# Patient Record
Sex: FEMALE | Race: WHITE | NOT HISPANIC OR LATINO | Employment: OTHER | URBAN - METROPOLITAN AREA
[De-identification: names, ages, dates, MRNs, and addresses within clinical notes are randomized per-mention and may not be internally consistent; named-entity substitution may affect disease eponyms.]

---

## 2017-05-23 ENCOUNTER — HOSPITAL ENCOUNTER (OUTPATIENT)
Dept: MAMMOGRAPHY | Facility: CLINIC | Age: 77
Discharge: HOME | End: 2017-05-23
Attending: INTERNAL MEDICINE | Admitting: INTERNAL MEDICINE
Payer: MEDICARE

## 2017-05-23 DIAGNOSIS — Z12.31 VISIT FOR SCREENING MAMMOGRAM: ICD-10-CM

## 2017-05-23 PROCEDURE — 77063 BREAST TOMOSYNTHESIS BI: CPT

## 2017-05-23 PROCEDURE — G0202 SCR MAMMO BI INCL CAD: HCPCS

## 2018-05-25 ENCOUNTER — HOSPITAL ENCOUNTER (OUTPATIENT)
Dept: MAMMOGRAPHY | Facility: CLINIC | Age: 78
Discharge: HOME | End: 2018-05-25
Attending: INTERNAL MEDICINE | Admitting: INTERNAL MEDICINE
Payer: MEDICARE

## 2018-05-25 DIAGNOSIS — Z12.31 VISIT FOR SCREENING MAMMOGRAM: ICD-10-CM

## 2018-05-25 PROCEDURE — 77063 BREAST TOMOSYNTHESIS BI: CPT

## 2019-05-28 ENCOUNTER — HOSPITAL ENCOUNTER (OUTPATIENT)
Dept: MAMMOGRAPHY | Facility: CLINIC | Age: 79
Discharge: HOME | End: 2019-05-28
Attending: INTERNAL MEDICINE | Admitting: INTERNAL MEDICINE
Payer: MEDICARE

## 2019-05-28 DIAGNOSIS — Z12.31 VISIT FOR SCREENING MAMMOGRAM: ICD-10-CM

## 2019-05-28 PROCEDURE — 77063 BREAST TOMOSYNTHESIS BI: CPT

## 2020-06-16 ENCOUNTER — HOSPITAL ENCOUNTER (OUTPATIENT)
Dept: MAMMOGRAPHY | Facility: CLINIC | Age: 80
Discharge: HOME | End: 2020-06-16
Attending: INTERNAL MEDICINE | Admitting: INTERNAL MEDICINE
Payer: MEDICARE

## 2020-06-16 DIAGNOSIS — Z12.31 SCREENING MAMMOGRAM, ENCOUNTER FOR: ICD-10-CM

## 2020-06-16 PROCEDURE — 77067 SCR MAMMO BI INCL CAD: CPT

## 2021-06-22 ENCOUNTER — HOSPITAL ENCOUNTER (OUTPATIENT)
Dept: MAMMOGRAPHY | Facility: CLINIC | Age: 81
Discharge: HOME | End: 2021-06-22
Attending: INTERNAL MEDICINE | Admitting: INTERNAL MEDICINE
Payer: MEDICARE

## 2021-06-22 DIAGNOSIS — Z12.31 VISIT FOR SCREENING MAMMOGRAM: ICD-10-CM

## 2021-06-22 PROCEDURE — 77063 BREAST TOMOSYNTHESIS BI: CPT

## 2022-06-28 ENCOUNTER — HOSPITAL ENCOUNTER (OUTPATIENT)
Dept: MAMMOGRAPHY | Facility: CLINIC | Age: 82
Discharge: HOME | End: 2022-06-28
Attending: INTERNAL MEDICINE | Admitting: INTERNAL MEDICINE
Payer: MEDICARE

## 2022-06-28 DIAGNOSIS — Z12.31 VISIT FOR SCREENING MAMMOGRAM: ICD-10-CM

## 2022-06-28 PROCEDURE — 77067 SCR MAMMO BI INCL CAD: CPT

## 2022-11-14 ENCOUNTER — APPOINTMENT (OUTPATIENT)
Dept: CT IMAGING | Facility: CLINIC | Age: 82
DRG: 066 | End: 2022-11-14
Attending: EMERGENCY MEDICINE
Payer: MEDICARE

## 2022-11-14 ENCOUNTER — HOSPITAL ENCOUNTER (INPATIENT)
Facility: CLINIC | Age: 82
LOS: 1 days | Discharge: HOME | DRG: 066 | End: 2022-11-16
Attending: EMERGENCY MEDICINE | Admitting: HOSPITALIST
Payer: MEDICARE

## 2022-11-14 ENCOUNTER — APPOINTMENT (OUTPATIENT)
Dept: MRI IMAGING | Facility: CLINIC | Age: 82
DRG: 066 | End: 2022-11-14
Attending: EMERGENCY MEDICINE
Payer: MEDICARE

## 2022-11-14 DIAGNOSIS — I63.9 CEREBROVASCULAR ACCIDENT (CVA), UNSPECIFIED MECHANISM (H): ICD-10-CM

## 2022-11-14 DIAGNOSIS — I10 BENIGN ESSENTIAL HYPERTENSION: Primary | ICD-10-CM

## 2022-11-14 LAB
ANION GAP SERPL CALCULATED.3IONS-SCNC: 10 MMOL/L (ref 7–15)
APTT PPP: 31 SECONDS (ref 22–38)
BASOPHILS # BLD AUTO: 0.1 10E3/UL (ref 0–0.2)
BASOPHILS NFR BLD AUTO: 1 %
BUN SERPL-MCNC: 15.8 MG/DL (ref 8–23)
CALCIUM SERPL-MCNC: 9.7 MG/DL (ref 8.8–10.2)
CHLORIDE SERPL-SCNC: 105 MMOL/L (ref 98–107)
CREAT SERPL-MCNC: 0.87 MG/DL (ref 0.51–0.95)
DEPRECATED HCO3 PLAS-SCNC: 27 MMOL/L (ref 22–29)
EOSINOPHIL # BLD AUTO: 0.2 10E3/UL (ref 0–0.7)
EOSINOPHIL NFR BLD AUTO: 2 %
ERYTHROCYTE [DISTWIDTH] IN BLOOD BY AUTOMATED COUNT: 12.8 % (ref 10–15)
GFR SERPL CREATININE-BSD FRML MDRD: 66 ML/MIN/1.73M2
GLUCOSE BLDC GLUCOMTR-MCNC: 104 MG/DL (ref 70–99)
GLUCOSE BLDC GLUCOMTR-MCNC: 104 MG/DL (ref 70–99)
GLUCOSE SERPL-MCNC: 108 MG/DL (ref 70–99)
HBA1C MFR BLD: 5.4 %
HCT VFR BLD AUTO: 39.2 % (ref 35–47)
HGB BLD-MCNC: 12.3 G/DL (ref 11.7–15.7)
IMM GRANULOCYTES # BLD: 0 10E3/UL
IMM GRANULOCYTES NFR BLD: 0 %
INR PPP: 1.05 (ref 0.85–1.15)
LYMPHOCYTES # BLD AUTO: 2.9 10E3/UL (ref 0.8–5.3)
LYMPHOCYTES NFR BLD AUTO: 36 %
MCH RBC QN AUTO: 31.1 PG (ref 26.5–33)
MCHC RBC AUTO-ENTMCNC: 31.4 G/DL (ref 31.5–36.5)
MCV RBC AUTO: 99 FL (ref 78–100)
MONOCYTES # BLD AUTO: 0.8 10E3/UL (ref 0–1.3)
MONOCYTES NFR BLD AUTO: 10 %
NEUTROPHILS # BLD AUTO: 4.1 10E3/UL (ref 1.6–8.3)
NEUTROPHILS NFR BLD AUTO: 51 %
NRBC # BLD AUTO: 0 10E3/UL
NRBC BLD AUTO-RTO: 0 /100
PLATELET # BLD AUTO: 301 10E3/UL (ref 150–450)
POTASSIUM SERPL-SCNC: 3.7 MMOL/L (ref 3.4–5.3)
RBC # BLD AUTO: 3.95 10E6/UL (ref 3.8–5.2)
SARS-COV-2 RNA RESP QL NAA+PROBE: NEGATIVE
SODIUM SERPL-SCNC: 142 MMOL/L (ref 136–145)
TROPONIN T SERPL HS-MCNC: 10 NG/L
WBC # BLD AUTO: 8.1 10E3/UL (ref 4–11)

## 2022-11-14 PROCEDURE — G1010 CDSM STANSON: HCPCS

## 2022-11-14 PROCEDURE — 250N000013 HC RX MED GY IP 250 OP 250 PS 637: Performed by: EMERGENCY MEDICINE

## 2022-11-14 PROCEDURE — 83036 HEMOGLOBIN GLYCOSYLATED A1C: CPT | Performed by: PHYSICIAN ASSISTANT

## 2022-11-14 PROCEDURE — 36415 COLL VENOUS BLD VENIPUNCTURE: CPT | Performed by: EMERGENCY MEDICINE

## 2022-11-14 PROCEDURE — 80048 BASIC METABOLIC PNL TOTAL CA: CPT | Performed by: EMERGENCY MEDICINE

## 2022-11-14 PROCEDURE — 82962 GLUCOSE BLOOD TEST: CPT

## 2022-11-14 PROCEDURE — 255N000002 HC RX 255 OP 636: Performed by: EMERGENCY MEDICINE

## 2022-11-14 PROCEDURE — 85730 THROMBOPLASTIN TIME PARTIAL: CPT | Performed by: EMERGENCY MEDICINE

## 2022-11-14 PROCEDURE — 85610 PROTHROMBIN TIME: CPT | Performed by: EMERGENCY MEDICINE

## 2022-11-14 PROCEDURE — A9585 GADOBUTROL INJECTION: HCPCS | Performed by: EMERGENCY MEDICINE

## 2022-11-14 PROCEDURE — G0378 HOSPITAL OBSERVATION PER HR: HCPCS

## 2022-11-14 PROCEDURE — 96361 HYDRATE IV INFUSION ADD-ON: CPT

## 2022-11-14 PROCEDURE — 93005 ELECTROCARDIOGRAM TRACING: CPT

## 2022-11-14 PROCEDURE — 99285 EMERGENCY DEPT VISIT HI MDM: CPT | Mod: 25

## 2022-11-14 PROCEDURE — U0003 INFECTIOUS AGENT DETECTION BY NUCLEIC ACID (DNA OR RNA); SEVERE ACUTE RESPIRATORY SYNDROME CORONAVIRUS 2 (SARS-COV-2) (CORONAVIRUS DISEASE [COVID-19]), AMPLIFIED PROBE TECHNIQUE, MAKING USE OF HIGH THROUGHPUT TECHNOLOGIES AS DESCRIBED BY CMS-2020-01-R: HCPCS | Performed by: EMERGENCY MEDICINE

## 2022-11-14 PROCEDURE — 250N000011 HC RX IP 250 OP 636: Performed by: EMERGENCY MEDICINE

## 2022-11-14 PROCEDURE — 85004 AUTOMATED DIFF WBC COUNT: CPT | Performed by: EMERGENCY MEDICINE

## 2022-11-14 PROCEDURE — 70498 CT ANGIOGRAPHY NECK: CPT | Mod: MG

## 2022-11-14 PROCEDURE — 96374 THER/PROPH/DIAG INJ IV PUSH: CPT | Mod: 59

## 2022-11-14 PROCEDURE — 258N000003 HC RX IP 258 OP 636: Performed by: EMERGENCY MEDICINE

## 2022-11-14 PROCEDURE — 70496 CT ANGIOGRAPHY HEAD: CPT | Mod: MG

## 2022-11-14 PROCEDURE — 99220 PR INITIAL OBSERVATION CARE,LEVEL III: CPT | Performed by: PHYSICIAN ASSISTANT

## 2022-11-14 PROCEDURE — 84484 ASSAY OF TROPONIN QUANT: CPT | Performed by: EMERGENCY MEDICINE

## 2022-11-14 PROCEDURE — G0509 CRIT CARE TELEHEA CONSULT 50: HCPCS | Mod: G0 | Performed by: PSYCHIATRY & NEUROLOGY

## 2022-11-14 PROCEDURE — C9803 HOPD COVID-19 SPEC COLLECT: HCPCS

## 2022-11-14 RX ORDER — ONDANSETRON 2 MG/ML
4 INJECTION INTRAMUSCULAR; INTRAVENOUS EVERY 6 HOURS PRN
Status: DISCONTINUED | OUTPATIENT
Start: 2022-11-14 | End: 2022-11-16 | Stop reason: HOSPADM

## 2022-11-14 RX ORDER — HYDRALAZINE HYDROCHLORIDE 20 MG/ML
10-20 INJECTION INTRAMUSCULAR; INTRAVENOUS
Status: DISCONTINUED | OUTPATIENT
Start: 2022-11-14 | End: 2022-11-16 | Stop reason: HOSPADM

## 2022-11-14 RX ORDER — GADOBUTROL 604.72 MG/ML
6.5 INJECTION INTRAVENOUS ONCE
Status: COMPLETED | OUTPATIENT
Start: 2022-11-14 | End: 2022-11-14

## 2022-11-14 RX ORDER — CLOPIDOGREL BISULFATE 75 MG/1
300 TABLET ORAL ONCE
Status: COMPLETED | OUTPATIENT
Start: 2022-11-14 | End: 2022-11-14

## 2022-11-14 RX ORDER — LIDOCAINE 40 MG/G
CREAM TOPICAL
Status: DISCONTINUED | OUTPATIENT
Start: 2022-11-14 | End: 2022-11-16 | Stop reason: HOSPADM

## 2022-11-14 RX ORDER — ASPIRIN 81 MG/1
81 TABLET ORAL DAILY
Status: CANCELLED | OUTPATIENT
Start: 2022-11-15

## 2022-11-14 RX ORDER — CHOLECALCIFEROL (VITAMIN D3) 50 MCG
1 TABLET ORAL DAILY
COMMUNITY

## 2022-11-14 RX ORDER — TRIAMCINOLONE ACETONIDE 1 MG/G
CREAM TOPICAL PRN
COMMUNITY

## 2022-11-14 RX ORDER — CLOPIDOGREL BISULFATE 75 MG/1
75 TABLET ORAL DAILY
Status: DISCONTINUED | OUTPATIENT
Start: 2022-11-15 | End: 2022-11-16 | Stop reason: HOSPADM

## 2022-11-14 RX ORDER — LABETALOL HYDROCHLORIDE 5 MG/ML
10-20 INJECTION, SOLUTION INTRAVENOUS EVERY 10 MIN PRN
Status: DISCONTINUED | OUTPATIENT
Start: 2022-11-14 | End: 2022-11-16 | Stop reason: HOSPADM

## 2022-11-14 RX ORDER — DIAZEPAM 10 MG/2ML
2.5 INJECTION, SOLUTION INTRAMUSCULAR; INTRAVENOUS ONCE
Status: COMPLETED | OUTPATIENT
Start: 2022-11-14 | End: 2022-11-14

## 2022-11-14 RX ORDER — CALCIUM CARBONATE 500 MG/1
1000 TABLET, CHEWABLE ORAL 4 TIMES DAILY PRN
Status: DISCONTINUED | OUTPATIENT
Start: 2022-11-14 | End: 2022-11-16 | Stop reason: HOSPADM

## 2022-11-14 RX ORDER — ONDANSETRON 4 MG/1
4 TABLET, ORALLY DISINTEGRATING ORAL EVERY 6 HOURS PRN
Status: DISCONTINUED | OUTPATIENT
Start: 2022-11-14 | End: 2022-11-16 | Stop reason: HOSPADM

## 2022-11-14 RX ORDER — ASPIRIN 325 MG
325 TABLET ORAL ONCE
Status: COMPLETED | OUTPATIENT
Start: 2022-11-14 | End: 2022-11-14

## 2022-11-14 RX ORDER — PANTOPRAZOLE SODIUM 40 MG/1
40 TABLET, DELAYED RELEASE ORAL
Status: DISCONTINUED | OUTPATIENT
Start: 2022-11-15 | End: 2022-11-16 | Stop reason: HOSPADM

## 2022-11-14 RX ORDER — IOPAMIDOL 755 MG/ML
500 INJECTION, SOLUTION INTRAVASCULAR ONCE
Status: COMPLETED | OUTPATIENT
Start: 2022-11-14 | End: 2022-11-14

## 2022-11-14 RX ORDER — ACETAMINOPHEN 325 MG/1
650 TABLET ORAL EVERY 4 HOURS PRN
Status: DISCONTINUED | OUTPATIENT
Start: 2022-11-14 | End: 2022-11-16 | Stop reason: HOSPADM

## 2022-11-14 RX ORDER — ASPIRIN 81 MG/1
81 TABLET, CHEWABLE ORAL DAILY
Status: DISCONTINUED | OUTPATIENT
Start: 2022-11-15 | End: 2022-11-14

## 2022-11-14 RX ADMIN — ASPIRIN 325 MG ORAL TABLET 325 MG: 325 PILL ORAL at 12:33

## 2022-11-14 RX ADMIN — IOPAMIDOL 75 ML: 755 INJECTION, SOLUTION INTRAVENOUS at 11:12

## 2022-11-14 RX ADMIN — GADOBUTROL 6.5 ML: 604.72 INJECTION INTRAVENOUS at 12:55

## 2022-11-14 RX ADMIN — SODIUM CHLORIDE 100 ML: 9 INJECTION, SOLUTION INTRAVENOUS at 11:13

## 2022-11-14 RX ADMIN — CLOPIDOGREL BISULFATE 300 MG: 75 TABLET ORAL at 12:33

## 2022-11-14 RX ADMIN — DIAZEPAM 2.5 MG: 5 INJECTION, SOLUTION INTRAMUSCULAR; INTRAVENOUS at 12:33

## 2022-11-14 ASSESSMENT — ACTIVITIES OF DAILY LIVING (ADL)
CONCENTRATING,_REMEMBERING_OR_MAKING_DECISIONS_DIFFICULTY: NO
ADLS_ACUITY_SCORE: 33
DOING_ERRANDS_INDEPENDENTLY_DIFFICULTY: NO
DIFFICULTY_COMMUNICATING: NO
WALKING_OR_CLIMBING_STAIRS_DIFFICULTY: NO
FALL_HISTORY_WITHIN_LAST_SIX_MONTHS: NO
ADLS_ACUITY_SCORE: 35
CHANGE_IN_FUNCTIONAL_STATUS_SINCE_ONSET_OF_CURRENT_ILLNESS/INJURY: NO
TOILETING_ISSUES: NO
ADLS_ACUITY_SCORE: 35
DRESSING/BATHING_DIFFICULTY: NO
ADLS_ACUITY_SCORE: 20
ADLS_ACUITY_SCORE: 33
ADLS_ACUITY_SCORE: 35
ADLS_ACUITY_SCORE: 35
DIFFICULTY_EATING/SWALLOWING: NO

## 2022-11-14 ASSESSMENT — ENCOUNTER SYMPTOMS
NAUSEA: 0
FACIAL ASYMMETRY: 1
WEAKNESS: 0
SPEECH DIFFICULTY: 1
TROUBLE SWALLOWING: 0

## 2022-11-14 NOTE — ED TRIAGE NOTES
Came in via EMS, was at the [employer] dancing when pt had difficulty with speech, unable to say words, L sided facial droop. Occurred at 1010. BPs were 220-230 systolic.  here in ED. No cardiac hx. HX high cholesterol and on estrogen supplements per EMS.      Triage Assessment     Row Name 11/14/22 1056       Triage Assessment (Adult)    Airway WDL WDL       Respiratory WDL    Respiratory WDL WDL       Skin Circulation/Temperature WDL    Skin Circulation/Temperature WDL WDL       Cardiac WDL    Cardiac WDL X  HTN       Cognitive/Neuro/Behavioral WDL    Cognitive/Neuro/Behavioral WDL X    Level of Consciousness alert    Orientation oriented x 4    Speech slow;logical       [place] Coma Scale    Best Eye Response 4-->(E4) spontaneous    Best Motor Response 6-->(M6) obeys commands    Best Verbal Response 5-->(V5) oriented    [place] Coma Scale Score 15

## 2022-11-14 NOTE — LETTER
[place] [address] [facility]  [address] [facility] [address]  [phone]    Re: Candy Garsia  [address] [facility] [address]  [phone] (home)     : 1940      To Whom It May Concern:      Candy Garsia was hospitalized from 2022 until 2022 due to medical illness.  She may be able to drive and operate a motor vehicle if she has no ongoing symptomatology.       Sincerely,  Eriberto Torre MD

## 2022-11-14 NOTE — PHARMACY-ADMISSION MEDICATION HISTORY
Admission medication history interview status for this patient is complete. See [facility] admission navigator for allergy information, prior to admission medications and immunization status.     Medication history interview done, indicate source(s): Patient (with family present in ED room)  Medication history resources (including written lists, pill bottles, clinic record):None  Pharmacy: [facility] in [employer] in [place].    Changes made to PTA medication list:  Added: vit D, triamcinolone cream  Changed: estradiol  Reported as Not Taking: meclizine, zofran, oxycodone  Removed: meclizine, zofran, oxycodone    Actions taken by pharmacist (provider contacted, etc):None     Additional medication history information:None    Medication reconciliation/reorder completed by provider prior to medication history?  no   (Y/N)     For patients on insulin therapy: no    Medication Sig Last Dose Taking?   cyanocobalamin 1000 MCG/ML injection Inject 1 mL as directed every 30 days. 11/3/2022 Yes   ESTRADIOL PO Take 0.5 mg by mouth twice a week Wed/Sat 11/12/2022 Yes   rabeprazole (ACIPHEX) 20 MG tablet Take 1 tablet by mouth daily. 11/14/2022 at am Yes   simvastatin (ZOCOR) 20 MG tablet Take 1 tablet by mouth At Bedtime. 11/13/2022 Yes   triamcinolone (KENALOG) 0.1 % external cream Apply topically as needed for irritation prn Yes   vitamin D3 (CHOLECALCIFEROL) 50 mcg (2000 units) tablet Take 1 tablet by mouth daily 11/14/2022 Yes

## 2022-11-14 NOTE — ED PROVIDER NOTES
History   Chief Complaint:  Stroke Symptoms       The history is provided by the patient and the EMS personnel.      Candy Garsia is a 82 year old female with history of hypercholesterolemia who presents with stroke symptoms. The patient was at a senior recreation center with friends, around 1015 she began having trouble getting her words out along with a left sided facial droop. She does not take any blood thinners, or had any head trauma. She denies any visual changes, trouble swallowing, nausea, weakness to extremities or head injury.    Review of Systems   HENT: Negative for trouble swallowing.    Eyes: Negative for visual disturbance.   Gastrointestinal: Negative for nausea.   Neurological: Positive for facial asymmetry (left sided droop) and speech difficulty. Negative for weakness.        (-) head injury   All other systems reviewed and are negative.    Allergies:  Compazine [Prochlorperazine]  Septra [Sulfamethoxazole W/Trimethoprim]  Zithromax [Azithromycin]  Latex    Medications:  Estradiol   Cyanocobalamin   Antivert   Zofran   Aciphex   Zocor     Past Medical History:     B 12 deficiency anemia   GERD  Dyslipidemia   Vertigo    Skin cancer   Benign tumor of uterus   Hypercholesterolemia      Past Surgical History:    Tonsillectomy   Hysterectomy   Skin biopsy   Colonoscopy     Family History:    Thyroid disease   Hyperlipidemia   Stomach cancer   Heart disease     Social History:  The patient presents to the ED alone  The patient [facility] from senior recreational facility.  Living Situation: independently   PCP: Angel Zapata     Physical Exam     Patient Vitals for the past 24 hrs:   BP Pulse Resp SpO2 Weight   11/14/22 1145 (!) 199/99 82 (!) 35 99 % --   11/14/22 1130 (!) 198/149 79 -- 98 % --   11/14/22 1123 -- -- -- 97 % --   11/14/22 1115 (!) 206/90 88 -- -- --   11/14/22 1103 -- -- -- -- 64 kg (141 lb 1.5 oz)   11/14/22 1100 -- -- -- 100 % --   11/14/22 1055 (!) 234/99 100 20 98 %  --       Physical Exam    HEENT:    Oropharynx is moist, without lesions or trismus.  Eyes:    Conjunctiva normal     PERRL     EOMs and visual fields intact  Neck:    Supple, no meningismus.     CV:     Regular rate and rhythm.      No murmurs, rubs or gallops.       No unilateral leg swelling.       2+ radial pulses bilateral.    PULM:    Clear to auscultation bilateral.       No respiratory distress.      Good air exchange.     No rales or wheezing.     No stridor.  ABD:    Soft, non-tender, non-distended.       No pulsatile masses.       No rebound, guarding or rigidity.  MSK:     No gross deformity to all four extremities.   LYMPH:   No cervical lymphadenopathy.  NEURO:   Alert & O x 3     Left CN VII weakness sparing the forehead otherwise CN's II-XII intact     Mild-moderate expressive aphasia     Finger to nose within normal limits.  No pronator drift.       Strength is 5/5 in all 4 extremities.  Sensation is intact.       Normal muscular tone, no tremor.  .   Good muscle tone, no atrophy.  Skin:    Warm, dry and intact.    Psych:    Mood is good and affect is appropriate.        Emergency Department Course   ECG  ECG obtained at 1144, ECG read at 1153  Normal sinus rhythm   Nonspecific ST abnormality   Abnormal ECG  No significant differences from prior ECG dated 7/4/15.  Rate 72 bpm. KY interval 164 ms. QRS duration 90 ms. QT/QTc 408/446 ms. P-R-T axes 60 -22 4.    Imaging:  MR Brain w/o & w Contrast   Preliminary Result   IMPRESSION:   1. Findings concerning for a punctate focus of hyperacute versus acute   ischemia/infarct involving the right inferior frontal gyrus.   2. Otherwise, no acute intracranial process.   3. Brain atrophy and presumed chronic small vessel ischemic changes,   as described.   4. Chronic cortical microhemorrhage in the lateral aspect of the left   precentral gyrus.      CTA Head Neck w Contrast   Preliminary Result   IMPRESSION:   1. Segmental severe stenosis versus subtotal  occlusion of an M2 branch   of the right middle cerebral artery, as described.   2. Otherwise, no high-grade stenosis or large vessel occlusion of the   major intracranial arteries.   3. Findings concerning for fibromuscular dysplasia involving the   bilateral internal carotid arteries.   4. Approximately 50-70% stenosis of the proximal left internal carotid   artery due to atherosclerosis.      Findings were discussed with Bolivar FAYE of the emergency   department at approximately 11:27 AM on 11/14/2022.      CT Head w/o Contrast   Final Result   IMPRESSION:   1. No acute intracranial hemorrhage, extra-axial fluid collection, or   mass effect.   2. Mild scattered patchy hypoattenuation in the cerebral white matter,   as described. This may represent sequela of chronic ischemic change.   No large confluent territorial infarction identified on CT.   3. Mild generalized brain parenchymal volume loss.      LEXI EDUARDO MD            SYSTEM ID:  [id number]        Report per radiology    Laboratory:  Labs Ordered and Resulted from Time of ED Arrival to Time of ED Departure   BASIC METABOLIC PANEL - Abnormal       Result Value    Sodium 142      Potassium 3.7      Chloride 105      Carbon Dioxide (CO2) 27      Anion Gap 10      Urea Nitrogen 15.8      Creatinine 0.87      Calcium 9.7      Glucose 108 (*)     GFR Estimate 66     CBC WITH PLATELETS AND DIFFERENTIAL - Abnormal    WBC Count 8.1      RBC Count 3.95      Hemoglobin 12.3      Hematocrit 39.2      MCV 99      MCH 31.1      MCHC 31.4 (*)     RDW 12.8      Platelet Count 301      % Neutrophils 51      % Lymphocytes 36      % Monocytes 10      % Eosinophils 2      % Basophils 1      % Immature Granulocytes 0      NRBCs per 100 WBC 0      Absolute Neutrophils 4.1      Absolute Lymphocytes 2.9      Absolute Monocytes 0.8      Absolute Eosinophils 0.2      Absolute Basophils 0.1      Absolute Immature Granulocytes 0.0      Absolute NRBCs 0.0     GLUCOSE BY METER -  Abnormal    GLUCOSE BY METER POCT 104 (*)    INR - Normal    INR 1.05     PARTIAL THROMBOPLASTIN TIME - Normal    aPTT 31     TROPONIN T, HIGH SENSITIVITY - Normal    Troponin T, High Sensitivity 10     GLUCOSE MONITOR NURSING POCT        Emergency Department Course:       Reviewed:  I reviewed nursing notes, vitals, past medical history and Care Everywhere    Assessments:  1056 I obtained history and examined the patient as noted above.   1217 I rechecked the patient and explained findings.     Consults:  1102 I consulted with stroke neurology about the patient and plan of care.  1220 I consulted with stroke neurology about the patient and plan of care.   I consulted with Alayna FAYE about the patient and plan of care.  1423 I consulted Dr. Betty MAY vascular surgery    Interventions:  1233 Aspirin 325 mg PO  1233 Plavix 300 mg PO  1233 Valium 2.5 mg IV  1255 Gadavist 6.5 mL IV    Disposition:  The patient was admitted to the hospital under the care of Dr. Thompson.     Impression & Plan       National Institutes of Health Stroke Scale  Exam Interval: Baseline   Score    Level of consciousness: (0)   Alert, keenly responsive    LOC questions: (0)   Answers both questions correctly    LOC commands: (0)   Performs both tasks correctly    Best gaze: (0)   Normal    Visual: (0)   No visual loss    Facial palsy: (2)   Partial paralysis (total/near total of lower face)    Motor arm (left): (0)   No drift    Motor arm (right): (0)   No drift    Motor leg (left): (0)   No drift    Motor leg (right): (0)   No drift    Limb ataxia: (0)   Absent    Sensory: (0)   Normal- no sensory loss    Best language: (1)   Mild to moderate aphasia    Dysarthria: (0)   Normal    Extinction and inattention: (0)   No abnormality        Total Score:  3          Medical Decision Makin-year-old female presented to the ED with abrupt onset of expressive aphasia and left cranial nerve VII weakness sparing the forehead in which  symptoms began 45 minutes prior to arrival.  She had no stroke mimics including hypoglycemia.  She underwent the tier 1 code stroke process.  Noncontrast CT scan was unremarkable but CTA revealed some vascular abnormalities.  Patient's symptoms completely resolved thus was not a tPA candidate.  Stroke neurology recommended load of aspirin and Plavix which was provided followed by MRI and admission.      I also spoke with Dr. Hernández of vascular surgery regarding the vascular findings in the face of a right inferior frontal gyrus infarct.  After discussion of case, vascular surgery feels comfortable with the patient being admitted at [facility] and outpatient follow-up with vascular surgery regarding the carotid artery stenosis of 50-70%.    Diagnosis:    ICD-10-CM    1. Cerebrovascular accident (CVA), unspecified mechanism (H)  I63.9           Discharge Medications:  New Prescriptions    No medications on file       Scribe Disclosure:  I, Rao Skinner, am serving as a scribe at 10:55 AM on 11/14/2022 to document services personally performed by Jeff Blanc MD based on my observations and the provider's statements to me.        Jeff Blanc MD  11/14/22 6236

## 2022-11-14 NOTE — ED NOTES
[facility]  ED Nurse Handoff Report    Candy Garsia is a 82 year old female   ED Chief complaint: Stroke Symptoms  . ED Diagnosis:   Final diagnoses:   Cerebrovascular accident (CVA), unspecified mechanism (H)     Allergies:   Allergies   Allergen Reactions     Compazine [Prochlorperazine]      Septra [Sulfamethoxazole W/Trimethoprim]      Zithromax [Azithromycin]        Code Status: Full Code  Activity level - Baseline/Home:  Independent. Activity Level - Current:   Stand by Assist. Lift room needed: No. Bariatric: No   Needed: No   Isolation: No. Infection: Not Applicable.     Vital Signs:   Vitals:    11/14/22 1358 11/14/22 1400 11/14/22 1411 11/14/22 1426   BP: (!) 198/95 (!) 198/86 (!) 194/84 (!) 189/80   BP Location:       Pulse: 63 62 60 65   Resp:  16     SpO2: 98% 99% 99% 100%   Weight:           Cardiac Rhythm:  ,      Pain level:    Patient confused: No. Patient Falls Risk: Yes.   Elimination Status: Has voided   Patient Report - Initial Complaint: CVA. Focused Assessment: The history is provided by the patient and the EMS personnel.      Candy Garsia is a 82 year old female with history of hypercholesterolemia who presents with stroke symptoms. The patient was at a senior recreation center with friends, around 1015 she began having trouble getting her words out along with a left sided facial droop. She does not take any blood thinners, or had any head trauma. She denies any visual changes, trouble swallowing, nausea, weakness to extremities or head injury.     Review of Systems   HENT: Negative for trouble swallowing.    Eyes: Negative for visual disturbance.   Gastrointestinal: Negative for nausea.   Neurological: Positive for facial asymmetry (left sided droop) and speech difficulty. Negative for weakness.        (-) head injury   All other systems reviewed and are negative.   Tests Performed: EKG, Labs, Imaging. Abnormal Results:   MR Brain w/o & w Contrast   Preliminary  Result   IMPRESSION:   1. Findings concerning for a punctate focus of hyperacute versus acute   ischemia/infarct involving the right inferior frontal gyrus.   2. Otherwise, no acute intracranial process.   3. Brain atrophy and presumed chronic small vessel ischemic changes,   as described.   4. Chronic cortical microhemorrhage in the lateral aspect of the left   precentral gyrus.      CTA Head Neck w Contrast   Preliminary Result   IMPRESSION:   1. Segmental severe stenosis versus subtotal occlusion of an M2 branch   of the right middle cerebral artery, as described.   2. Otherwise, no high-grade stenosis or large vessel occlusion of the   major intracranial arteries.   3. Findings concerning for fibromuscular dysplasia involving the   bilateral internal carotid arteries.   4. Approximately 50-70% stenosis of the proximal left internal carotid   artery due to atherosclerosis.      Findings were discussed with Bolivar FAYE of the emergency   department at approximately 11:27 AM on 11/14/2022.      CT Head w/o Contrast   Final Result   IMPRESSION:   1. No acute intracranial hemorrhage, extra-axial fluid collection, or   mass effect.   2. Mild scattered patchy hypoattenuation in the cerebral white matter,   as described. This may represent sequela of chronic ischemic change.   No large confluent territorial infarction identified on CT.   3. Mild generalized brain parenchymal volume loss.      LEXI EDUARDO MD            SYSTEM ID:  [id number]        Labs Ordered and Resulted from Time of ED Arrival to Time of ED Departure   BASIC METABOLIC PANEL - Abnormal       Result Value    Sodium 142      Potassium 3.7      Chloride 105      Carbon Dioxide (CO2) 27      Anion Gap 10      Urea Nitrogen 15.8      Creatinine 0.87      Calcium 9.7      Glucose 108 (*)     GFR Estimate 66     CBC WITH PLATELETS AND DIFFERENTIAL - Abnormal    WBC Count 8.1      RBC Count 3.95      Hemoglobin 12.3      Hematocrit 39.2      MCV 99       MCH 31.1      MCHC 31.4 (*)     RDW 12.8      Platelet Count 301      % Neutrophils 51      % Lymphocytes 36      % Monocytes 10      % Eosinophils 2      % Basophils 1      % Immature Granulocytes 0      NRBCs per 100 WBC 0      Absolute Neutrophils 4.1      Absolute Lymphocytes 2.9      Absolute Monocytes 0.8      Absolute Eosinophils 0.2      Absolute Basophils 0.1      Absolute Immature Granulocytes 0.0      Absolute NRBCs 0.0     GLUCOSE BY METER - Abnormal    GLUCOSE BY METER POCT 104 (*)    INR - Normal    INR 1.05     PARTIAL THROMBOPLASTIN TIME - Normal    aPTT 31     TROPONIN T, HIGH SENSITIVITY - Normal    Troponin T, High Sensitivity 10     GLUCOSE MONITOR NURSING POCT   COVID-19 VIRUS (CORONAVIRUS) BY PCR     .   Treatments provided: See MAR  Family Comments: Family at bedside  OBS brochure/video discussed/provided to patient:  Yes  ED Medications:   Medications   sodium chloride (PF) 0.9% PF flush 60 mL (60 mLs Intravenous Not Given 11/14/22 1334)   0.9% sodium chloride BOLUS (0 mLs Intravenous Stopped 11/14/22 1348)   iopamidol (ISOVUE-370) solution 500 mL (75 mLs Intravenous Given 11/14/22 1112)   aspirin (ASA) tablet 325 mg (325 mg Oral Given 11/14/22 1233)   clopidogrel (PLAVIX) tablet 300 mg (300 mg Oral Given 11/14/22 1233)   gadobutrol (GADAVIST) injection 6.5 mL (6.5 mLs Intravenous Given 11/14/22 1255)   diazepam (VALIUM) injection 2.5 mg (2.5 mg Intravenous Given 11/14/22 1233)     Drips infusing:  No  For the majority of the shift, the patient's behavior Green. Interventions performed were N/A.    Sepsis treatment initiated: No     Patient tested for COVID 19 prior to admission: YES    ED Nurse Name/Phone Number: Nilda Jamison RN,   2:41 PM  RECEIVING UNIT ED HANDOFF REVIEW    Above ED Nurse Handoff Report was reviewed: Yes  Reviewed by: Ngoc Thompson RN on November 14, 2022 at 5:53 PM

## 2022-11-14 NOTE — CONSULTS
"[facility]    Stroke Consult Note    Reason for Consult: Stroke Code     Chief Complaint: Stroke Symptoms      HPI  Candy Garsia is a 82 year old female with PMH of HLD. She presents to the ED for evaluation of weakness and speech changes. LKW was 0945 at which time she developed acute onset aphasia and left facial droop. Presenting /99. She is not on known anticoagulation or antiplatelets.     On tele exam, NIH is 1 for very subtle left facial droop, although her  denies any identifiable change in how her face appears vs her baseline. Candy reiterates that she suddenly experienced her speaking \"coming out funny, slow\" and he  felt that she was somewhat slurred when they initially spoke over the phone. No identified weakness of the limbs, numbness, visual disturbance or other neurological symptoms.     Imaging Findings  CT head: no hemorrhage or other acute findings  CTA head/neck: severe stenosis vs subtotal occlusion of right M2, plaque with 50-70% stenosis of left ICA, mild plaque/calcification in right ICA stenosis    Intravenous Thrombolysis  Not given due to:   - minor/isolated/quickly resolving symptoms    Endovascular Treatment  Not initiated due to absence of proximal vessel occlusion    Impression   1. Left facial droop and aphasia; greatly improved with NIH=1 for mild facial droop. Concern for small infarct vs TIA. Patient not currently candidate for acute treatment given low NIH and no LVO    2. Right M2 stenosis vs subocclusive thrombus    3. Left ICA plaque and stenosis    Recommendations  - Neurochecks and Vital Signs q 30 minutes while patient remains in TNK window (until approximately 1415); please STAT page stroke team or re-activate stroke code if patient develops deficits   - ASA 325mg PO x1 now, followed by 325mg daily  - Plavix 300mg Po x1 now, followed by 75mg daily  - Statin: Lipitor 40mg  - MRI Brain with and without contrast  -Echo without " "bubble study  -consider vascular surgery consult--after MRI will have additional clarity if left ICA may be symptomatic   - 24-hour Telemetry  - Bedside Glucose Monitoring  - A1c, Lipid Panel  - PT/OT/SLP  - Stroke Education  - Euthermia, Euglycemia  -please place order for stroke neurology consult    Ashley MARIE CNP  Vascular Neurology  To page me or covering stroke neurology team member, click here: AMCOM   Choose \"On Call\" tab at top, then search dropdown box for \"Neurology Adult\", select location, press Enter, then look for stroke/neuro ICU/telestroke.  ______________________________________________________    Clinically Significant Risk Factors Present on Admission                      Past Medical History   Past Medical History:   Diagnosis Date     Dyslipidemia      Past Surgical History   No past surgical history on file.  Medications   Home Meds  Prior to Admission medications    Medication Sig Start Date End Date Taking? Authorizing Provider   cyanocobalamin 1000 MCG/ML injection Inject 1 mL as directed every 30 days.    Reported, Patient   ESTRADIOL PO     Reported, Patient   meclizine (ANTIVERT) 25 MG tablet Take 1 tablet (25 mg) by mouth 3 times daily as needed for dizziness 7/4/15   Thomas Tenorio MD   ondansetron (ZOFRAN) 4 MG tablet Take 1 tablet (4 mg) by mouth every 8 hours as needed for nausea 7/4/15   Thomas Tenorio MD   oxyCODONE (ROXICODONE) 5 MG immediate release tablet Take 1 tablet (5 mg) by mouth every 6 hours as needed for pain 6/23/16   Ana Sarabia MD   rabeprazole (ACIPHEX) 20 MG tablet Take 1 tablet by mouth daily. 1/3/12   Wild Marks MD   simvastatin (ZOCOR) 20 MG tablet Take 1 tablet by mouth At Bedtime. 6/4/12   Wild Marks MD       Scheduled Meds    gadobutrol  6.5 mL Intravenous Once     sodium chloride (PF)  60 mL Intravenous Once       Infusion Meds      PRN Meds      Allergies   Allergies   Allergen Reactions     Compazine [Prochlorperazine]  "     Septra [Sulfamethoxazole W/Trimethoprim]      Zithromax [Azithromycin]      Family History   No family history on file.  Social History   Social History     Tobacco Use     Smoking status: Never   Substance Use Topics     Alcohol use: Yes     Drug use: No       Review of Systems   The 10 point Review of Systems is negative other than noted in the HPI or here.        PHYSICAL EXAMINATION  Pulse:  [] 82  Resp:  [20-35] 35  BP: (198-234)/() 199/99  SpO2:  [97 %-100 %] 99 %     General Exam  General:  patient lying in bed without any acute distress    HEENT:  normocephalic/atraumatic  Pulmonary:  no respiratory distress      Neuro Exam  Mental Status:  alert, oriented x 3, follows commands, speech clear and fluent, naming and repetition normal  Cranial Nerves:  visual fields intact (tested by nurse), EOMI with normal smooth pursuit, facial sensation intact and symmetric (tested by nurse), subtle left lower facial droop, hearing not formally tested but intact to conversation, no dysarthria, shoulder shrug equal bilaterally, tongue protrusion midline  Motor:  no abnormal movements, able to move all limbs antigravity spontaneously with no signs of hemiparesis observed, no pronator drift   Reflexes:  unable to test (telestroke)  Sensory:  light touch sensation intact and symmetric throughout upper and lower extremities (assessed by nurse), no extinction on double simultaneous stimulation (assessed by nurse)  Coordination:  normal finger-to-nose and heel-to-shin bilaterally without dysmetria, rapid alternating movements symmetric  Station/Gait:  unable to test due to telestroke    Dysphagia Screen  Per Nursing    Stroke Scales    NIHSS  1a. Level of Consciousness 0-->Alert, keenly responsive   1b. LOC Questions 0-->Answers both questions correctly   1c. LOC Commands 0-->Performs both tasks correctly   2.   Best Gaze 0-->Normal   3.   Visual 0-->No visual loss   4.   Facial Palsy 1-->Minor paralysis (flattened  nasolabial fold, asymmetry on smiling)   5a. Motor Arm, Left 0-->No drift, limb holds 90 (or 45) degrees for full 10 secs   5b. Motor Arm, Right 0-->No drift, limb holds 90 (or 45) degrees for full 10 secs   6a. Motor Leg, Left 0-->No drift, leg holds 30 degree position for full 5 secs   6b. Motor Leg, right 0-->No drift, leg holds 30 degree position for full 5 secs   7.   Limb Ataxia 0-->Absent   8.   Sensory 0-->Normal, no sensory loss   9.   Best Language 0-->No aphasia, normal   10. Dysarthria 0-->Normal   11. Extinction and Inattention  0-->No abnormality   Total 1 (11/14/22 1115)       Imaging  I personally reviewed all imaging; relevant findings per HPI.     Lab Results Data   CBC  Recent Labs   Lab 11/14/22  1100   WBC 8.1   RBC 3.95   HGB 12.3   HCT 39.2        Basic Metabolic Panel    Recent Labs   Lab 11/14/22  1102 11/14/22  1100   NA  --  142   POTASSIUM  --  3.7   CHLORIDE  --  105   CO2  --  27   BUN  --  15.8   CR  --  0.87   * 108*   CHERI  --  9.7     Liver Panel  No results for input(s): PROTTOTAL, ALBUMIN, BILITOTAL, ALKPHOS, AST, ALT, BILIDIRECT in the last 168 hours.  INR    Recent Labs   Lab Test 11/14/22  1100   INR 1.05      Lipid Profile  No lab results found.  A1C  No lab results found.  Troponin    Recent Labs   Lab 11/14/22  1100   CTROPT 10          Stroke Code Data Data   Stroke Code Data  (for stroke code with tele)  Stroke code activated 11/14/22   1058   First stroke provider response 11/14/22   1104   Video start time 11/14/22   1111   Video end time 11/14/22   1128   Last known normal 11/14/22   0945   Time of discovery  (or onset of symptoms)  11/14/22   0945   Head CT read by Stroke Neuro Dr/Provider 11/14/22   1110   Was stroke code de-escalated? Yes 11/14/22 1132           Telestroke Service Details  Type of service telemedicine diagnostic assessment of acute neurological changes   Reason telemedicine is appropriate patient requires assessment with a specialist for  diagnosis and treatment of neurological symptoms   Mode of transmission secure interactive audio and video communication per Rubi   Originating site (patient location) [facility]    Distant site (provider location) [facility]

## 2022-11-14 NOTE — ED NOTES
Updated from neuro, Partial R M2 branch occlusion. As long as pt remains symptom free no interventions at this time. Repage stroke neuro if symptom changes.

## 2022-11-14 NOTE — H&P
[facility] Hospitalist Admission Note  Name: Candy Garsia    MRN: [id number]  YOB: 1940    Age: 82 year old  Date of admission: 11/14/2022  Primary care provider: Angel Zapata    Assessment & Plan   Candy Garsia is a 82 year old female with PMHx significant for HLD, who was admitted on 11/14/2022 after presenting for evaluation of transient speech changes, concern for stroke.    In the ED hypertensive.  NIH 1 on initial codes stroke exam per report very subtle left facial droop, although may be her baseline. Euglycemic. Labs including cbc, bmp, coags & high sensitive Tn I unremarkable. EKG not obtained in ED.   CTA head/neck: severe stenosis vs subtotal occlusion of right M2, plaque with 50-70% stenosis of left ICA, mild plaque/calcification in right ICA stenosis. Read as concerning for fibromuscular dysplasia involving bilateral ICA, MRI pending.      On my assessment speech normalized, per patient while in the ED around 11:30-12PM while waiting for imaging. She denies any other symptoms such as visual disturbance, paraesthesias, weakness in limbs. No stroke, arrhythmia, bleeding disorder history. No headache. No recent medication changes.     Discussed with Dr. Blanc in the ED, full chart review including lab work, imaging, and vital signs were reviewed. Patient received DAPT in the ED. Dr Blanc spoke with Dr Hernández of Vascular Surgery and Vascular Neurology. Admission was requested to observation from hospitalist service for monitoring if no intervention pending MRI.     CVA   Carotid Artery Stenosis  HLD  Pt presented with abrupt onsest expressive aphasia, weakness across L CN VII all resolved. Evaluated code stroke. Noncontrast CT scan was unremarkable but CTA revealed some vascular abnormalities.  Code stroke de escalated, not a TPA candidate. Loaded with DAPT followed by MRI. MRI concerning for acute ischemia/infarct involving the right inferior frontal gyrus. Also  "noted Chronic cortical microhemorrhage in the lateral aspect of the left  precentral gyrus.  - Vascular Surgery, Dr Hernández was contacted regarding MRI - did not recommend transfer or vascular surgery intervention regarding carotid stenosis.   - admit obs with neuro monitoring and Ischemic Stroke order set. Discontinue ancillary consults as no needs identified.  - ekg pending, tele monitoring [facility]   - Neurochecks   - DAPT: ASA 325mg PO x1 now, followed by 325mg daily, Plavix 300mg Po x1 now, followed by 75mg daily. No contraindications to anticoagulation or DAPT identified.   - TTE, lipid panel, A1c  - continue nightly statin  - permissive HTN with PRN labetalol ordered  - anticipate discharge to home 11/15 with outpt follow up - Vascular Neurology, Vascular Surgery regarding 50-70% stenosis of the proximal left internal carotid artery due to atherosclerosis.    DVT Prophylaxis: Pneumatic Compression Devices  Code Status: Full Code as discussed on admission     Expected Discharge Date: 11/15/2022               Family Updated with Plan of Care: family at bedside in ED updated with POC.     Alayna Begum PA-C    Primary Care Physician   Angel Zapata    Chief Complaint   \"stroke symptoms\"    History is obtained from the patient.   Services Used: No    History of Present Illness   Candy Garsia is a 82 year old female who presents with speech changes.  The morning of presentation (11/14) around 10:00 AM she was at the [employer] doing a line dancing class. She developed difficulty speaking, described as \"slow speech\". She called her spouse, while speaking with him on the phone he noted her speech sounded abnormal. An individual at the recreation center was concerned about stroke, as was the patient, thus they called 911 for evaluation.    In the ED hypertensive.  NIH 1 on initial codes stroke exam per report very subtle left facial droop, although may be her baseline. Euglycemic. Labs " including cbc, bmp, coags & high sensitive Tn I unremarkable. EKG not obtained in ED.   CTA head/neck: severe stenosis vs subtotal occlusion of right M2, plaque with 50-70% stenosis of left ICA, mild plaque/calcification in right ICA stenosis. MRI pending.     On my assessment speech normalized, per patient while in the ED around 11:30-12PM while waiting for imaging. She denies any other symptoms such as visual disturbance, paraesthesias, weakness in limbs. No stroke, arrhythmia, bleeding disorder history. No headache. No recent medication changes.     Discussed with Dr. Blanc in the ED, full chart review including lab work, imaging, and vital signs were reviewed. Patient received DAPT in the ED. Dr Blanc spoke with Dr Hernández of Vascular Surgery and Vascular Neurology. Admission was requested to observation from hospitalist service for monitoring if no intervention pending MRI.     Past Medical History    I have reviewed this patient's medical history and updated it with pertinent information if needed.   Past Medical History:   Diagnosis Date     Dyslipidemia        Past Surgical History   I have reviewed this patient's surgical history and updated it with pertinent information if needed.    Prior to Admission Medications   Prior to Admission Medications   Prescriptions Last Dose Informant Patient Reported? Taking?   ESTRADIOL PO   Yes No   cyanocobalamin 1000 MCG/ML injection   Yes No   Sig: Inject 1 mL as directed every 30 days.   meclizine (ANTIVERT) 25 MG tablet   No No   Sig: Take 1 tablet (25 mg) by mouth 3 times daily as needed for dizziness   ondansetron (ZOFRAN) 4 MG tablet   No No   Sig: Take 1 tablet (4 mg) by mouth every 8 hours as needed for nausea   oxyCODONE (ROXICODONE) 5 MG immediate release tablet   No No   Sig: Take 1 tablet (5 mg) by mouth every 6 hours as needed for pain   rabeprazole (ACIPHEX) 20 MG tablet   No No   Sig: Take 1 tablet by mouth daily.   simvastatin (ZOCOR) 20 MG tablet    No No   Sig: Take 1 tablet by mouth At Bedtime.      Facility-Administered Medications: None     Allergies   Allergies   Allergen Reactions     Compazine [Prochlorperazine]      Septra [Sulfamethoxazole W/Trimethoprim]      Zithromax [Azithromycin]        Social History   I have reviewed this patient's social history and updated it with pertinent information if needed. Candy Garsia  reports that she has never smoked. She does not have any smokeless tobacco history on file. She reports current alcohol use. She reports that she does not use drugs.    Family History   I have reviewed this patient's family history and updated it with pertinent information if needed.   No family history on file.    Review of Systems   The 10 point Review of Systems is negative other than noted in the HPI or here.     Physical Exam       BP: (!) 198/86 Pulse: 62   Resp: 16 SpO2: 99 % O2 Device: None (Room air)    Vital Signs with Ranges  Pulse:  [] 62  Resp:  [8-35] 16  BP: (198-234)/() 198/86  SpO2:  [97 %-100 %] 99 %  141 lbs 1.51 oz    Constitutional: Awake, alert,  no apparent distress.  Eyes: Conjunctiva and pupils examined and normal.  HEENT: Non traumatic. Moist mucous membranes, normal dentition.  Respiratory: Clear to auscultation bilaterally, no crackles or wheezing.  Cardiovascular: Regular rate and rhythm, normal S1 and S2, and no murmur noted.  GI: Soft, non-distended, non-tender, bowel sounds present. No rebound tenderness or guarding.  Lymph/Hematologic: No anterior cervical or supraclavicular adenopathy.  Skin: Warm, dry. No edema.  Musculoskeletal: No gross deformities noted.  No erythema or tenderness. Moving all extremities.  Neurologic: No tremor. Speech is clear. Moving all extremities with symmetrical strength. CN 2-12 grossly intact.  Coordination and sensation intact.   Psychiatric: Appropriate affect.    Data   Data reviewed today:        Imaging:   Recent Results (from the past 24 hour(s))   CT Head  w/o Contrast    Narrative    CT SCAN OF THE HEAD WITHOUT CONTRAST   11/14/2022 11:10 AM     HISTORY: Stroke code. Left-sided facial droop and word finding  difficulties.    TECHNIQUE:  Axial images of the head and coronal reformations without  IV contrast material. Radiation dose for this scan was reduced using  automated exposure control, adjustment of the mA and/or kV according  to patient size, or iterative reconstruction technique.    COMPARISON: MRI of the brain dated 7/4/2015.    FINDINGS: There is no evidence of intracranial hemorrhage, mass, acute  infarct or anomaly. The ventricles are normal in size, shape and  configuration. Mild diffuse parenchymal volume loss. Mild patchy  periventricular white matter hypodensities which are nonspecific, but  likely related to chronic microvascular ischemic disease. Small focus  of hypoattenuation along the anterior limb of the left internal  capsule which may represent a dilated perivascular space or small  chronic infarct/chronic small vessel ischemic change.    The visualized portions of the sinuses and mastoids appear normal. The  bony calvarium and bones of the skull base appear intact.       Impression    IMPRESSION:  1. No acute intracranial hemorrhage, extra-axial fluid collection, or  mass effect.  2. Mild scattered patchy hypoattenuation in the cerebral white matter,  as described. This may represent sequela of chronic ischemic change.  No large confluent territorial infarction identified on CT.  3. Mild generalized brain parenchymal volume loss.    LEXI EDUARDO MD         SYSTEM ID:  [id number]   CTA Head Neck w Contrast    Narrative    CT ANGIOGRAM OF THE HEAD AND NECK WITH CONTRAST  11/14/2022 11:17 AM     HISTORY: Code stroke. Left-sided facial droop, word-finding  difficulties.    TECHNIQUE:  CT angiography with an injection of IV with scans through  the head and neck. Images were transferred to a separate 3-D  workstation where multiplanar reformations  and 3-D images were  created. Estimates of carotid stenoses are made relative to the distal  internal carotid artery diameters except as noted. Radiation dose for  this scan was reduced using automated exposure control, adjustment of  the mA and/or kV according to patient size, or iterative  reconstruction technique.    COMPARISON: CT head of same day. MR angiogram of the head and neck  dated 7/4/2015.     CT ANGIOGRAM HEAD FINDINGS: There is atherosclerotic disease involving  the bilateral carotid siphons with up to moderate stenosis of the  paraclinoid segment of the right internal carotid artery. Otherwise,  no significant stenosis of the intracranial internal carotid arteries.  Developmentally hypoplastic or absent A1 segment of the right anterior  cerebral artery, a normal variant. No high-grade stenosis of the  proximal branches of the anterior cerebral arteries is noted.    There appears to be severe stenosis/subtotal occlusion of an 8 mm  length segment of an M2 branch of the right middle cerebral artery  (series 5 images 355-363) arising from the dominant anterior division  branch and extending anterolaterally along the anterior aspect of the  right sylvian fissure (series 8 image 41, series 10 image 19). This  branch traverses anterior to the right temporal lobe and is along the  posterior margin of the right inferior frontal gyrus. Otherwise, no  high-grade stenosis involving the major proximal branches of the right  middle cerebral artery is identified. No significant stenosis or large  vessel occlusion of the major proximal branches of the left middle  cerebral artery.    The bilateral vertebral arteries, the basilar artery, and the proximal  major branches of the posterior cervical arteries are patent. No  intracranial aneurysm or high flow vascular malformation is  identified.    CT ANGIOGRAM NECK FINDINGS:   Normal origin of the great vessels from the aortic arch.     Right carotid artery: There is  mild atherosclerosis of the carotid  bifurcation without significant stenosis. There is tandem luminal  irregularity involving the mid to distal cervical internal carotid  artery, concerning for fibromuscular dysplasia.    Left carotid artery: There is atherosclerotic disease that is  predominantly calcified involving the carotid bifurcation and proximal  internal carotid artery resulting in 50-70% stenosis by NASCET  criteria.    Mild luminal irregularity involving the mid cervical left internal  carotid artery, concerning for fibromuscular dysplasia.    Vertebral arteries: Vertebral arteries are patent without evidence of  dissection. No significant stenosis.     Other findings: Multilevel degenerative changes of the cervical spine.      Impression    IMPRESSION:  1. Segmental severe stenosis versus subtotal occlusion of an M2 branch  of the right middle cerebral artery, as described.  2. Otherwise, no high-grade stenosis or large vessel occlusion of the  major intracranial arteries.  3. 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  4. Approximately 50-70% stenosis of the proximal left internal carotid  artery due to atherosclerosis.    Findings were discussed with [place] FAYE of the emergency  department at approximately 11:27 AM on 11/14/2022.   MR Brain w/o & w Contrast    Narrative    MRI BRAIN WITHOUT AND WITH CONTRAST  11/14/2022 1:33 PM     HISTORY: Expressive aphasia and left facial droop.     TECHNIQUE: Multiplanar, multisequence MRI of the brain without and  with 6.5mL Gadavist.     COMPARISON: CT head dated 11/14/2022. MRI head dated 7/4/2015.    FINDINGS: Findings concerning for a punctate focus of restricted  diffusion localizing to the pars opercularis of the right inferior  frontal gyrus (series 3 image 55, series 4 image 19) without  definitive corresponding abnormal signal on the T2 FLAIR weighted  sequence, concerning for small focus of  hyperacute versus acute  ischemia/infarct. No other areas of abnormal intracranial restricted  diffusion are identified.    The ventricles are normal in size and configuration. There is mild  generalized brain parenchymal volume loss. Small chronic infarct  versus dilated perivascular space in the right basal ganglia/anterior  limb internal capsule region (series 6 image 18). Dilated perivascular  spaces favored over small chronic infarcts in the left basal ganglia  region. Mild to moderate scattered patchy nonspecific T2 FLAIR  hyperintense signal in the cerebral white matter, most likely  representing chronic small vessel ischemic change. Small focus of  susceptibility related signal loss involving the cortex of the lateral  left precentral gyrus, concerning for probable chronic microhemorrhage  (series 7 image 20). Otherwise, no intracranial hemorrhage, extra  axial fluid collection, or mass effect. No intracranial mass or  abnormal enhancement identified. The major arterial flow voids of the  skull base are grossly maintained.    Orbits appear within normal limits, accounting for limitations of  technique. There is mucosal thickening versus retention cyst or polyp  in the right sphenoid sinus, as before. The calvarium, skull base, and  midface otherwise appear unremarkable.      Impression    IMPRESSION:  1. Findings concerning for a punctate focus of hyperacute versus acute  ischemia/infarct involving the right inferior frontal gyrus.  2. Otherwise, no acute intracranial process.  3. Brain atrophy and presumed chronic small vessel ischemic changes,  as described.  4. Chronic cortical microhemorrhage in the lateral aspect of the left  precentral gyrus.       Recent Labs   Lab 11/14/22  1102 11/14/22  1100   WBC  --  8.1   HGB  --  12.3   MCV  --  99   PLT  --  301   INR  --  1.05   NA  --  142   POTASSIUM  --  3.7   CHLORIDE  --  105   CO2  --  27   BUN  --  15.8   CR  --  0.87   ANIONGAP  --  10   CHERI  --  9.7    * 108*       Alayna Begum PA-C on 11/14/2022 at 2:31 PM

## 2022-11-15 ENCOUNTER — APPOINTMENT (OUTPATIENT)
Dept: CARDIOLOGY | Facility: CLINIC | Age: 82
DRG: 066 | End: 2022-11-15
Attending: PHYSICIAN ASSISTANT
Payer: MEDICARE

## 2022-11-15 ENCOUNTER — APPOINTMENT (OUTPATIENT)
Dept: ULTRASOUND IMAGING | Facility: CLINIC | Age: 82
DRG: 066 | End: 2022-11-15
Attending: NURSE PRACTITIONER
Payer: MEDICARE

## 2022-11-15 PROBLEM — N95.1 MENOPAUSAL FLUSHING: Status: ACTIVE | Noted: 2022-01-25

## 2022-11-15 PROBLEM — J01.01 ACUTE RECURRENT MAXILLARY SINUSITIS: Status: ACTIVE | Noted: 2018-06-05

## 2022-11-15 PROBLEM — I10 BENIGN ESSENTIAL HYPERTENSION: Status: ACTIVE | Noted: 2022-11-15

## 2022-11-15 PROBLEM — I63.9 CEREBROVASCULAR ACCIDENT (CVA), UNSPECIFIED MECHANISM (H): Status: ACTIVE | Noted: 2022-11-15

## 2022-11-15 LAB
ANION GAP SERPL CALCULATED.3IONS-SCNC: 8 MMOL/L (ref 7–15)
ATRIAL RATE - MUSE: 72 BPM
BUN SERPL-MCNC: 19.2 MG/DL (ref 8–23)
CALCIUM SERPL-MCNC: 9 MG/DL (ref 8.8–10.2)
CHLORIDE SERPL-SCNC: 106 MMOL/L (ref 98–107)
CHOLEST SERPL-MCNC: 163 MG/DL
CREAT SERPL-MCNC: 0.85 MG/DL (ref 0.51–0.95)
DEPRECATED HCO3 PLAS-SCNC: 28 MMOL/L (ref 22–29)
DIASTOLIC BLOOD PRESSURE - MUSE: NORMAL MMHG
ERYTHROCYTE [DISTWIDTH] IN BLOOD BY AUTOMATED COUNT: 12.9 % (ref 10–15)
GFR SERPL CREATININE-BSD FRML MDRD: 68 ML/MIN/1.73M2
GLUCOSE SERPL-MCNC: 87 MG/DL (ref 70–99)
HCT VFR BLD AUTO: 36.2 % (ref 35–47)
HDLC SERPL-MCNC: 56 MG/DL
HGB BLD-MCNC: 11.3 G/DL (ref 11.7–15.7)
INTERPRETATION ECG - MUSE: NORMAL
LDLC SERPL CALC-MCNC: 90 MG/DL
LVEF ECHO: NORMAL
MCH RBC QN AUTO: 30.9 PG (ref 26.5–33)
MCHC RBC AUTO-ENTMCNC: 31.2 G/DL (ref 31.5–36.5)
MCV RBC AUTO: 99 FL (ref 78–100)
NONHDLC SERPL-MCNC: 107 MG/DL
P AXIS - MUSE: 60 DEGREES
PLATELET # BLD AUTO: 257 10E3/UL (ref 150–450)
POTASSIUM SERPL-SCNC: 3.6 MMOL/L (ref 3.4–5.3)
PR INTERVAL - MUSE: 164 MS
QRS DURATION - MUSE: 90 MS
QT - MUSE: 408 MS
QTC - MUSE: 446 MS
R AXIS - MUSE: -22 DEGREES
RBC # BLD AUTO: 3.66 10E6/UL (ref 3.8–5.2)
SODIUM SERPL-SCNC: 142 MMOL/L (ref 136–145)
SYSTOLIC BLOOD PRESSURE - MUSE: NORMAL MMHG
T AXIS - MUSE: 4 DEGREES
TRIGL SERPL-MCNC: 83 MG/DL
VENTRICULAR RATE- MUSE: 72 BPM
WBC # BLD AUTO: 7.4 10E3/UL (ref 4–11)

## 2022-11-15 PROCEDURE — 93880 EXTRACRANIAL BILAT STUDY: CPT

## 2022-11-15 PROCEDURE — G0425 INPT/ED TELECONSULT30: HCPCS | Mod: G0 | Performed by: NURSE PRACTITIONER

## 2022-11-15 PROCEDURE — 99207 PR NO BILLABLE SERVICE THIS VISIT: CPT | Performed by: STUDENT IN AN ORGANIZED HEALTH CARE EDUCATION/TRAINING PROGRAM

## 2022-11-15 PROCEDURE — 250N000013 HC RX MED GY IP 250 OP 250 PS 637: Performed by: PHYSICIAN ASSISTANT

## 2022-11-15 PROCEDURE — 99233 SBSQ HOSP IP/OBS HIGH 50: CPT | Performed by: INTERNAL MEDICINE

## 2022-11-15 PROCEDURE — G0378 HOSPITAL OBSERVATION PER HR: HCPCS

## 2022-11-15 PROCEDURE — 93306 TTE W/DOPPLER COMPLETE: CPT | Mod: 26 | Performed by: INTERNAL MEDICINE

## 2022-11-15 PROCEDURE — 120N000001 HC R&B MED SURG/OB

## 2022-11-15 PROCEDURE — 36415 COLL VENOUS BLD VENIPUNCTURE: CPT | Performed by: PHYSICIAN ASSISTANT

## 2022-11-15 PROCEDURE — 80061 LIPID PANEL: CPT | Performed by: PHYSICIAN ASSISTANT

## 2022-11-15 PROCEDURE — 85027 COMPLETE CBC AUTOMATED: CPT | Performed by: PHYSICIAN ASSISTANT

## 2022-11-15 PROCEDURE — 250N000013 HC RX MED GY IP 250 OP 250 PS 637: Performed by: INTERNAL MEDICINE

## 2022-11-15 PROCEDURE — 80048 BASIC METABOLIC PNL TOTAL CA: CPT | Performed by: PHYSICIAN ASSISTANT

## 2022-11-15 PROCEDURE — 93306 TTE W/DOPPLER COMPLETE: CPT

## 2022-11-15 RX ORDER — ASPIRIN 325 MG
325 TABLET, DELAYED RELEASE (ENTERIC COATED) ORAL DAILY
Qty: 30 TABLET | Refills: 0 | Status: SHIPPED | OUTPATIENT
Start: 2022-11-15

## 2022-11-15 RX ORDER — LISINOPRIL 10 MG/1
10 TABLET ORAL DAILY
Status: DISCONTINUED | OUTPATIENT
Start: 2022-11-15 | End: 2022-11-16 | Stop reason: HOSPADM

## 2022-11-15 RX ORDER — ASPIRIN 81 MG/1
81 TABLET ORAL DAILY
Status: DISCONTINUED | OUTPATIENT
Start: 2022-11-15 | End: 2022-11-15

## 2022-11-15 RX ORDER — LISINOPRIL 5 MG/1
5 TABLET ORAL DAILY
Status: DISCONTINUED | OUTPATIENT
Start: 2022-11-15 | End: 2022-11-15

## 2022-11-15 RX ORDER — METOPROLOL TARTRATE 25 MG/1
25 TABLET, FILM COATED ORAL 2 TIMES DAILY
Status: DISCONTINUED | OUTPATIENT
Start: 2022-11-15 | End: 2022-11-16 | Stop reason: HOSPADM

## 2022-11-15 RX ORDER — ATORVASTATIN CALCIUM 40 MG/1
40 TABLET, FILM COATED ORAL EVERY EVENING
Status: DISCONTINUED | OUTPATIENT
Start: 2022-11-15 | End: 2022-11-16 | Stop reason: HOSPADM

## 2022-11-15 RX ORDER — ASPIRIN 325 MG
325 TABLET ORAL DAILY
Status: DISCONTINUED | OUTPATIENT
Start: 2022-11-16 | End: 2022-11-16 | Stop reason: HOSPADM

## 2022-11-15 RX ORDER — ATORVASTATIN CALCIUM 40 MG/1
40 TABLET, FILM COATED ORAL EVERY EVENING
Qty: 30 TABLET | Refills: 0 | Status: SHIPPED | OUTPATIENT
Start: 2022-11-15

## 2022-11-15 RX ORDER — CLOPIDOGREL BISULFATE 75 MG/1
75 TABLET ORAL DAILY
Qty: 30 TABLET | Refills: 0 | Status: SHIPPED | OUTPATIENT
Start: 2022-11-15

## 2022-11-15 RX ORDER — LISINOPRIL 10 MG/1
10 TABLET ORAL DAILY
Qty: 30 TABLET | Refills: 0 | Status: SHIPPED | OUTPATIENT
Start: 2022-11-15

## 2022-11-15 RX ORDER — SIMVASTATIN 20 MG
20 TABLET ORAL AT BEDTIME
Status: DISCONTINUED | OUTPATIENT
Start: 2022-11-15 | End: 2022-11-15

## 2022-11-15 RX ADMIN — METOPROLOL TARTRATE 25 MG: 25 TABLET, FILM COATED ORAL at 21:10

## 2022-11-15 RX ADMIN — METOPROLOL TARTRATE 25 MG: 25 TABLET, FILM COATED ORAL at 17:07

## 2022-11-15 RX ADMIN — CLOPIDOGREL BISULFATE 75 MG: 75 TABLET ORAL at 11:06

## 2022-11-15 RX ADMIN — ASPIRIN 81 MG: 81 TABLET, COATED ORAL at 11:06

## 2022-11-15 RX ADMIN — LISINOPRIL 10 MG: 10 TABLET ORAL at 11:06

## 2022-11-15 RX ADMIN — ATORVASTATIN CALCIUM 40 MG: 40 TABLET, FILM COATED ORAL at 21:10

## 2022-11-15 ASSESSMENT — ACTIVITIES OF DAILY LIVING (ADL)
ADLS_ACUITY_SCORE: 20

## 2022-11-15 NOTE — PHARMACY-CONSULT NOTE
Pharmacy Consult to evaluate for medication related stroke core measures    Candy Garsia, 82 year old female admitted for weakness and speech changes on 11/14/2022.    Thrombolytic was not given because of minor/isolated/quickly resolving symptoms    VTE Prophylaxis SCDs /PCDs placed on 11/14, as appropriate prior to end of hospital day 2.    Antithrombotic: aspirin and clopidogrel started on 11/14, as appropriate by end of hospital day 2. Continue antithrombotic therapy on discharge to meet quality measures, unless contraindicated.    Anticoagulation if history of A-fib/flutter: Patient does not have history of A-fib/flutter - anticoagulation not required for medication related stroke core measures.     LDL Cholesterol Calculated   Date Value Ref Range Status   05/31/2012 172 (H) <130 mg/dL (calc) Final     Comment:        Desirable range <100 mg/dL for patients with CHD or  diabetes and <70 mg/dL for diabetic patients with  known heart disease.          Patient currently receiving Lipitor (atorvastatin) continue statin on discharge to meet quality measures, unless contraindicated.    Recommendations: None at this time    Thank you for the consult.    GERSON MALHOTRA RPH 11/15/2022 7:58 AM

## 2022-11-15 NOTE — PLAN OF CARE
Arrived to room 324 from ER at via cart, alert and oriented x 4, oriented to room and call system, IV patent and SL, rates pain 0/10, reviewed welcome folder and pain/medication information packet with patient. Pt States she may bring in one of her home medication as it may not be on our formulary Aciphex.  Admission profile started.      Goal Outcome Evaluation:      Plan of Care Reviewed With: patient  BP (!) 187/82 (BP Location: Right arm)   Pulse 69   Temp 97.4  F (36.3  C)   Resp 16   Wt 61.4 kg (135 lb 6.4 oz)   LMP  (LMP Unknown)   SpO2 100%   BMI 25.58 kg/m

## 2022-11-15 NOTE — PLAN OF CARE
"PRIMARY DIAGNOSIS: \"GENERIC\" NURSING  OUTPATIENT/OBSERVATION GOALS TO BE MET BEFORE DISCHARGE:  ADLs back to baseline: Yes    Activity and level of assistance: Up with standby assistance.    Pain status: Pain free.    Return to near baseline physical activity: Yes     Discharge Planner Nurse   Safe discharge environment identified: Yes  Barriers to discharge: No       Entered by: Shabnam Guo RN 11/15/2022      Please review provider order for any additional goals.   Nurse to notify provider when observation goals have been met and patient is ready for discharge.Goal Outcome Evaluation:                        "

## 2022-11-15 NOTE — CONSULTS
[facility]    Stroke Consult Note    Reason for Consult:  Stroke    Chief Complaint: Stroke Symptoms       HPI  Candy Garsia is a 82 year old female with a PMHx significant for dyslipidemia, GERD, and anemia who presented with acute onset left facial weakness and speech changes. She was found to be hypertensive with  on arrival. NIHSS was 1 for subtle left facial droop (although  reported this is her baseline). Today, she feels she is back at her baseline. She describes the episode of speech difficulty as having slow and slurred speech. She was fluent, but just had a difficult time getting the words out.     Stroke Evaluation Summarized    MRI/Head CT CT: no acute pathology, CSVID, mild generalized volume loss   MRI: acute/hyperacute right inferior frontal gyrus infarct, atrophy, CSVID, chronic cortical microhemorrhage (left precentral gyrus)   Intracranial Vasculature CTA: severe stenosis vs occlusion of right M2   Cervical Vasculature CTA: bilateral ICA fibromuscular dysplasia, 50-70% left ICA stenosis d/t atherosclerosis     Echocardiogram EF 60-65%, no WMA   EKG/Telemetry SR   Other Testing Carotid US: right carotid w/out significant stenosis; left carotid with 50-69% stenosis     LDL  11/15/2022: 90 mg/dL   A1C  11/14/2022: 5.4 %   Troponin 11/14/2022: 10 ng/L       Impression  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 but cannot r/o embolic stroke of undetermined source (ESUS)    Recommendations  - okay to discharge from a stroke perspective   - normotension, prns for SBP > 180; start anti-hypertensive today, close PCP follow up   - Daily aspirin 325 mg indefinitely for secondary stroke prevention  - Plavix (clopidogrel) 75 mg PO Daily x 90 days  - Statin: atorvastatin 40 mg daily, LDL goal 40-70  - Telemetry  - Bedside Glucose Monitoring  - Nutrition: stroke education   - PT/OT/SLP not indicated; pt is back to baseline  - Stroke Education  -  "Depression Screen  - Apnea screening questions  - Euthermia, Euglycemia  - no need for IP vascular surgery consult or OP f/u   - discharge with cardiac monitor   - pt was advised to take her BP at home twice daily (her  has a machine) and keep a log to bring to her PCP f/u     Patient Follow-up    - in the next 1-2 week(s) with PCP  - in 6-8 weeks with general neurology ([phone])    Thank you for this consult. We will sign off.     Rupali Weldon NP  Vascular Neurology    To page me or covering stroke neurology team member, click here: [facility]   Choose \"On Call\" tab at top, then search dropdown box for \"Neurology Adult\", select location, press Enter, then look for stroke/neuro ICU/telestroke.  _____________________________________________________    Clinically Significant Risk Factors Present on Admission                      Past Medical History   Past Medical History:   Diagnosis Date     Dyslipidemia      Past Surgical History   No past surgical history on file.  Medications   Home Meds  Prior to Admission medications    Medication Sig Start Date End Date Taking? Authorizing Provider   cyanocobalamin 1000 MCG/ML injection Inject 1 mL as directed every 30 days.   Yes Reported, Patient   ESTRADIOL PO Take 0.5 mg by mouth twice a week Wed/Sat   Yes Reported, Patient   rabeprazole (ACIPHEX) 20 MG tablet Take 1 tablet by mouth daily. 1/3/12  Yes Wild Marks MD   simvastatin (ZOCOR) 20 MG tablet Take 1 tablet by mouth At Bedtime. 6/4/12  Yes Wild Marks MD   triamcinolone (KENALOG) 0.1 % external cream Apply topically as needed for irritation   Yes Unknown, Entered By History   vitamin D3 (CHOLECALCIFEROL) 50 mcg (2000 units) tablet Take 1 tablet by mouth daily   Yes Unknown, Entered By History       Scheduled Meds    aspirin  325 mg Oral Daily     clopidogrel  75 mg Oral or NG Tube Daily     pantoprazole  40 mg Oral QAM AC     sodium chloride (PF)  3 mL Intracatheter Q8H       Infusion Meds    " - MEDICATION INSTRUCTIONS -         PRN Meds  acetaminophen, calcium carbonate, labetalol **OR** hydrALAZINE, lidocaine 4%, lidocaine (buffered or not buffered), - MEDICATION INSTRUCTIONS -, ondansetron **OR** ondansetron, sodium chloride (PF)    Allergies   Allergies   Allergen Reactions     Compazine [Prochlorperazine]      Septra [Sulfamethoxazole W/Trimethoprim]      Zithromax [Azithromycin]      Family History   No family history on file.  Social History   Social History     Tobacco Use     Smoking status: Never   Substance Use Topics     Alcohol use: Yes     Drug use: No       Review of Systems   The 10 point Review of Systems is negative other than noted in the HPI or here.        PHYSICAL EXAMINATION   Temp:  [97.2  F (36.2  C)-97.9  F (36.6  C)] 97.6  F (36.4  C)  Pulse:  [] 70  Resp:  [8-35] 18  BP: (176-234)/() 184/77  SpO2:  [96 %-100 %] 96 %    Neuro Exam  Mental Status:  alert, oriented x 3, follows commands, speech clear and fluent, naming and repetition normal  Cranial Nerves:  visual fields intact (tested by nurse), EOMI with normal smooth pursuit, facial sensation intact and symmetric (tested by nurse), facial movements symmetric, hearing not formally tested but intact to conversation, no dysarthria, shoulder shrug equal bilaterally, tongue protrusion midline  Motor:  no abnormal movements, able to move all limbs antigravity spontaneously with no signs of hemiparesis observed, no pronator drift  Reflexes:  unable to test (telestroke)  Sensory:  light touch sensation intact and symmetric throughout upper and lower extremities (assessed by nurse), no extinction on double simultaneous stimulation (assessed by nurse)  Coordination:  rapid alternating movements symmetric  Station/Gait:  unable to test due to telestroke    Dysphagia Screen  Per Nursing    Stroke Scales    NIHSS  1a. Level of Consciousness 0-->Alert, keenly responsive   1b. LOC Questions 0-->Answers both questions correctly    1c. LOC Commands 0-->Performs both tasks correctly   2.   Best Gaze 0-->Normal   3.   Visual 0-->No visual loss   4.   Facial Palsy 0-->Normal symmetrical movements   5a. Motor Arm, Left 0-->No drift, limb holds 90 (or 45) degrees for full 10 secs   5b. Motor Arm, Right 0-->No drift, limb holds 90 (or 45) degrees for full 10 secs   6a. Motor Leg, Left 0-->No drift, leg holds 30 degree position for full 5 secs   6b. Motor Leg, right 0-->No drift, leg holds 30 degree position for full 5 secs   7.   Limb Ataxia 0-->Absent   8.   Sensory 0-->Normal, no sensory loss   9.   Best Language 0-->No aphasia, normal   10. Dysarthria 0-->Normal   11. Extinction and Inattention  0-->No abnormality   Total 0 (11/15/22 1120)       Modified [place] Score (Pre-morbid)  0 - No symptoms.     Imaging  I personally reviewed all imaging; relevant findings per HPI.    Labs Data   CBC  Recent Labs   Lab 11/15/22  0648 11/14/22  1100   WBC 7.4 8.1   RBC 3.66* 3.95   HGB 11.3* 12.3   HCT 36.2 39.2    301     Basic Metabolic Panel   Recent Labs   Lab 11/15/22  0648 11/14/22  1846 11/14/22  1102 11/14/22  1100     --   --  142   POTASSIUM 3.6  --   --  3.7   CHLORIDE 106  --   --  105   CO2 28  --   --  27   BUN 19.2  --   --  15.8   CR 0.85  --   --  0.87   GLC 87 104* 104* 108*   CHERI 9.0  --   --  9.7     Liver Panel  No results for input(s): PROTTOTAL, ALBUMIN, BILITOTAL, ALKPHOS, AST, ALT, BILIDIRECT in the last 168 hours.  INR    Recent Labs   Lab Test 11/14/22  1100   INR 1.05      Lipid Profile  No lab results found.  A1C    Recent Labs   Lab Test 11/14/22  1100   A1C 5.4     Troponin    Recent Labs   Lab 11/14/22  1100   CTROPT 10          Stroke Consult Data Data   Telestroke Service Details  (for non-emergent stroke consult with tele)  Video start time 11/14/22   1111   Video end time 11/14/22   1128   Type of service telemedicine diagnostic assessment of acute neurological changes   Reason telemedicine is appropriate  patient requires assessment with a specialist for diagnosis and treatment of neurological symptoms   Mode of transmission secure interactive audio and video communication per Rubi   Originating site (patient location) [facility]    Distant site (provider location) [facility]     I have personally spent a total of 60 minutes providing care today, time spent in reviewing medical records and reviewing tests, examining the patient and obtaining history, coordination of care, and discussion with the patient and/or family regarding diagnostic results, prognosis, symptom management, risks and benefits of management options, and development of plan of care. Greater than 50% was spent in counseling and coordination of care.

## 2022-11-15 NOTE — CARE PLAN
Pt is A&O x4. VSS. On RA. Hypertensive. On regular diet. IV SL. Voids in the bathroom. Ambulates stand by assist. Denies pain. On TELE: SR 65. Sleeps between cares. Plan to be discharged to home on 11/15.

## 2022-11-15 NOTE — PLAN OF CARE
Care assumed [id number]    Pt A/Ox4, VSS on RA ex htn; PRN antihypertensive for SBP >220. Denies pain at this time. Neuro and CMS intact, LS clear, SBA to BR, voiding adequately, no BM. Tolerating reg diet. PIV SL. Tele NSR in 60s. CTM and provide supportive cares.     Vital signs:  Temp: 97.9  F (36.6  C)   BP: (!) 183/63 Pulse: 66   Resp: 18 SpO2: 98 % O2 Device: None (Room air)

## 2022-11-15 NOTE — DISCHARGE SUMMARY
[facility]  Hospitalist Discharge Summary      Date of Admission:  11/14/2022  Date of Discharge:  11/15/2022  Discharging Provider: Eriberto Torre MD, MD  Discharge Service: Hospitalist Service    Discharge Diagnoses   Acute ischemic stroke of the right inferior frontal gyrus.  Embolic stroke of undetermined source  Newly diagnosed with benign essential hypertension  Dyslipidemia  Carotid artery stenosis    Follow-ups Needed After Discharge   Follow-up Appointments     Follow-up and recommended labs and tests       Follow up with primary care provider, Angel Zapata, within   7 days to evaluate medication change, to evaluate treatment change, and   for hospital follow- up.  The following labs/tests are recommended:   Patient will be discharged with cardiac monitoring.  Please monitor your blood pressure levels at home and bring your   monitoring log upon follow-up.  You will need to follow-up with neurology for your recent CVA.             Unresulted Labs Ordered in the Past 30 Days of this Admission     Date and Time Order Name Status Description    11/15/2022  5:00 AM Lipid panel reflex to direct LDL: Non-fasting In process       These results will be followed up by PCP    Discharge Disposition   Discharged to home  Condition at discharge: Stable      I assume service care today.  Seen and examined.  Chart reviewed.  I met this very pleasant lady this morning while she is laying comfortably in bed and eating her breakfast tray.  She mentioned that she is feeling much improved and better.  Feeling back to her baseline state.  No further issues with aphasia, no mental status changes.  Denies any focal weakness nor numbness or tingling sensation.  Seen by stroke neurology service.  Current recommendations to continue dual antiplatelets with Plavix and aspirin for the next 90 days and then switch to full dose of aspirin after 90 days.  Change her statins from Zocor to  Lipitor at 40 mg at bedtime.  Started her on antihypertensives as she presented here with severe uncontrolled hypertension.  She is not on any maintenance medications prior to this hospitalization.  She will be initiated on lisinopril 10 mg here and will be continued upon discharge.  This can be further titrated and even adding another agent if blood pressure levels not significantly improve upon follow-up.  She is wanting for home discharge later today if she continues to show improvement and remained stable.  She has a pending carotid artery ultrasound and echocardiogram.    She has no aspiration-like symptoms and tolerating oral diet.    Addendum: Discharge plans deferred due to severe uncontrolled BP levels.    I will refer you to excerpts of my colleagues prior documentation with their admission H&P as listed below for other details of her hospital stay.      Hospital Course   Candy Garsia is a 82 year old female with PMHx significant for HLD, who was admitted on 11/14/2022 after presenting for evaluation of transient speech changes, concern for stroke.    In the ED hypertensive.  NIH 1 on initial codes stroke exam per report very subtle left facial droop, although may be her baseline. Euglycemic. Labs including cbc, bmp, coags & high sensitive Tn I unremarkable. EKG not obtained in ED.   CTA head/neck: severe stenosis vs subtotal occlusion of right M2, plaque with 50-70% stenosis of left ICA, mild plaque/calcification in right ICA stenosis. Read as concerning for fibromuscular dysplasia involving bilateral ICA, MRI pending.      On my assessment speech normalized, per patient while in the ED around 11:30-12PM while waiting for imaging. She denies any other symptoms such as visual disturbance, paraesthesias, weakness in limbs. No stroke, arrhythmia, bleeding disorder history. No headache. No recent medication changes.     Discussed with Dr. Blanc in the ED, full chart review including lab work, imaging, and  vital signs were reviewed. Patient received DAPT in the ED. Dr Blanc spoke with Dr Hernández of Vascular Surgery and Vascular Neurology. Admission was requested to observation from hospitalist service for monitoring if no intervention pending MRI.     CVA   Carotid Artery Stenosis  HLD  Pt presented with abrupt onsest expressive aphasia, weakness across L CN VII all resolved. Evaluated code stroke. Noncontrast CT scan was unremarkable but CTA revealed some vascular abnormalities.  Code stroke de escalated, not a TPA candidate. Loaded with DAPT followed by MRI. MRI concerning for acute ischemia/infarct involving the right inferior frontal gyrus. Also noted Chronic cortical microhemorrhage in the lateral aspect of the left  precentral gyrus.  - Vascular Surgery, Dr Hernández was contacted regarding MRI - did not recommend transfer or vascular surgery intervention regarding carotid stenosis.   - admit obs with neuro monitoring and Ischemic Stroke order set. Discontinue ancillary consults as no needs identified.  - ekg pending, tele monitoring ovenright   - Neurochecks   - DAPT: ASA 325mg PO x1 now, followed by 325mg daily, Plavix 300mg Po x1 now, followed by 75mg daily. No contraindications to anticoagulation or DAPT identified.   - TTE, lipid panel, A1c  - continue nightly statin  - permissive HTN with PRN labetalol ordered  - anticipate discharge to home 11/15 with outpt follow up - Vascular Neurology, Vascular Surgery regarding 50-70% stenosis of the proximal left internal carotid artery due to atherosclerosis.    DVT Prophylaxis: Pneumatic Compression Devices  Code Status: Full Code as discussed on admission     Expected Discharge Date: 11/15/2022               Family Updated with Plan of Care: family at bedside in ED updated with POC.     Alayna Begum PA-C      Consultations This Hospital Stay   PATIENT LEARNING CENTER IP CONSULT  NEUROLOGY IP STROKE CONSULT  PHARMACY IP CONSULT  PHARMACY IP CONSULT    Code  Status   Full Code    Time Spent on this Encounter   I, Eriberto Torre MD, MD, personally saw the patient today and spent greater than 30 minutes discharging this patient.       Eriberto Torre MD, MD  [place] [address] [facility]  [address]  [facility] [address]  Phone: [phone]  Fax: [phone]  ______________________________________________________________________    Physical Exam   Vital Signs: Temp: 97.6  F (36.4  C) Temp src: Oral BP: (!) 184/77 Pulse: 70   Resp: 18 SpO2: 96 % O2 Device: None (Room air)    Weight: 135 lbs 6.4 oz  HEENT; Atraumatic, normocephalic, pinkish conjuctiva, pupils bilateral reactive   Skin: warm and moist, no rashes  Lungs: equal chest expansion, clear to auscultation, no wheezes, no stridor, no crackles,   Heart: normal rate, normal rhythm, no rubs or gallops.   Abdomen: normal bowel sounds, no tenderness, no peritoneal signs, no guarding  Extremities: no deformities, no edema   Neuro; follow commands, alert and oriented x3, spontaneous speech, coherent, moves all extremities spontaneously  Psych; no hallucination, euthymic mood, not agitated           Primary Care Physician   Angel Zapata    Discharge Orders      Reason for your hospital stay    Admitted the hospital for acute CVA found with severe uncontrolled hypertension.     Follow-up and recommended labs and tests     Follow up with primary care provider, Angel Zapata, within 7 days to evaluate medication change, to evaluate treatment change, and for hospital follow- up.  The following labs/tests are recommended: Patient will be discharged with cardiac monitoring.  Please monitor your blood pressure levels at home and bring your monitoring log upon follow-up.  You will need to follow-up with neurology for your recent CVA.     Activity    Your activity upon discharge: activity as tolerated     Full Code     Diet    Follow this diet upon discharge: Orders  Placed This Encounter      Regular Diet Adult       Significant Results and Procedures   Most Recent 3 CBC's:Recent Labs   Lab Test 11/15/22  0648 11/14/22  1100 07/04/15  1405   WBC 7.4 8.1 6.1   HGB 11.3* 12.3 12.4   MCV 99 99 94    301 264     Most Recent 3 BMP's:Recent Labs   Lab Test 11/15/22  0648 11/14/22  1846 11/14/22  1102 11/14/22  1100 07/04/15  1405     --   --  142 142   POTASSIUM 3.6  --   --  3.7 3.4   CHLORIDE 106  --   --  105 107   CO2 28  --   --  27 26   BUN 19.2  --   --  15.8 14   CR 0.85  --   --  0.87 0.73   ANIONGAP 8  --   --  10 9   CHERI 9.0  --   --  9.7 8.4*   GLC 87 104* 104* 108* 98     Most Recent 3 INR's:Recent Labs   Lab Test 11/14/22  1100   INR 1.05     Most Recent 3 Troponin's:No lab results found.  7-Day Micro Results     Collected Updated Procedure Result Status      11/14/2022 1406 11/14/2022 1517 Asymptomatic COVID-19 Virus (Coronavirus) by PCR Nasopharyngeal [[id number]]    Swab from Nasopharyngeal    Final result Component Value   SARS CoV2 PCR Negative   NEGATIVE: SARS-CoV-2 (COVID-19) RNA not detected, presumed negative.                Most Recent Hemoglobin A1c:Recent Labs   Lab Test 11/14/22  1100   A1C 5.4     Most Recent 6 glucoses:Recent Labs   Lab Test 11/15/22  0648 11/14/22  1846 11/14/22  1102 11/14/22  1100 07/04/15  1405   GLC 87 104* 104* 108* 98   ,   Results for orders placed or performed during the hospital encounter of 11/14/22   CT Head w/o Contrast    Narrative    CT SCAN OF THE HEAD WITHOUT CONTRAST   11/14/2022 11:10 AM     HISTORY: Stroke code. Left-sided facial droop and word finding  difficulties.    TECHNIQUE:  Axial images of the head and coronal reformations without  IV contrast material. Radiation dose for this scan was reduced using  automated exposure control, adjustment of the mA and/or kV according  to patient size, or iterative reconstruction technique.    COMPARISON: MRI of the brain dated 7/4/2015.    FINDINGS: There is no  evidence of intracranial hemorrhage, mass, acute  infarct or anomaly. The ventricles are normal in size, shape and  configuration. Mild diffuse parenchymal volume loss. Mild patchy  periventricular white matter hypodensities which are nonspecific, but  likely related to chronic microvascular ischemic disease. Small focus  of hypoattenuation along the anterior limb of the left internal  capsule which may represent a dilated perivascular space or small  chronic infarct/chronic small vessel ischemic change.    The visualized portions of the sinuses and mastoids appear normal. The  bony calvarium and bones of the skull base appear intact.       Impression    IMPRESSION:  1. No acute intracranial hemorrhage, extra-axial fluid collection, or  mass effect.  2. Mild scattered patchy hypoattenuation in the cerebral white matter,  as described. This may represent sequela of chronic ischemic change.  No large confluent territorial infarction identified on CT.  3. Mild generalized brain parenchymal volume loss.    LEXI EDUARDO MD         SYSTEM ID:  [id number]   CTA Head Neck w Contrast    Narrative    CT ANGIOGRAM OF THE HEAD AND NECK WITH CONTRAST  11/14/2022 11:17 AM     HISTORY: Code stroke. Left-sided facial droop, word-finding  difficulties.    TECHNIQUE:  CT angiography with an injection of 75 mL [id number]-[id number] IV  with scans through the head and neck. Images were transferred to a  separate 3-D workstation where multiplanar reformations and 3-D images  were created. Estimates of carotid stenoses are made relative to the  distal internal carotid artery diameters except as noted. Radiation  dose for this scan was reduced using automated exposure control,  adjustment of the mA and/or kV according to patient size, or iterative  reconstruction technique.    COMPARISON: CT head of same day. MR angiogram of the head and neck  dated 7/4/2015.     CT ANGIOGRAM HEAD FINDINGS: There is atherosclerotic disease involving  the bilateral  carotid siphons with up to moderate stenosis of the  paraclinoid segment of the right internal carotid artery. Otherwise,  no significant stenosis of the intracranial internal carotid arteries.  Developmentally hypoplastic or absent A1 segment of the right anterior  cerebral artery, a normal variant. No high-grade stenosis of the  proximal branches of the anterior cerebral arteries is noted.    There appears to be severe stenosis/subtotal occlusion of an 8 mm  length segment of an M2 branch of the right middle cerebral artery  (series 5 images 355-363, series 1002 image 3) arising from the  dominant anterior division branch and extending anterolaterally along  the anterior aspect of the right sylvian fissure (series 8 image 41,  series 10 image 19). This branch traverses anterior to the right  temporal lobe and is along the posterior margin of the right inferior  frontal gyrus. Mild to moderate/moderate focal stenosis of the more  proximal aspect of that same M2 branch of the right middle cerebral  artery (series 1002 image 1). Otherwise, no high-grade stenosis  involving the major proximal branches of the right middle cerebral  artery is identified. No significant stenosis or large vessel  occlusion of the major proximal branches of the left middle cerebral  artery.    The bilateral vertebral arteries, the basilar artery, and the proximal  major branches of the posterior cervical arteries are patent. No  intracranial aneurysm or high flow vascular malformation is  identified.    CT ANGIOGRAM NECK FINDINGS:   Normal origin of the great vessels from the aortic arch.     Right carotid artery: There is mild atherosclerosis of the carotid  bifurcation without significant stenosis. There is tandem luminal  irregularity involving the mid to distal cervical internal carotid  artery, concerning for fibromuscular dysplasia.    Left carotid artery: There is atherosclerotic disease that is  predominantly calcified involving  the carotid bifurcation and proximal  internal carotid artery resulting in 50-70% stenosis by NASCET  criteria.    Mild luminal irregularity involving the mid cervical left internal  carotid artery, concerning for fibromuscular dysplasia.    Vertebral arteries: Vertebral arteries are patent without evidence of  dissection. No significant stenosis.     Other findings: Multilevel degenerative changes of the cervical spine.      Impression    IMPRESSION:  1. Segmental severe stenosis versus subtotal occlusion of an M2 branch  of the right middle cerebral artery, as described.  2. Otherwise, no high-grade stenosis or large vessel occlusion of the  major intracranial arteries.  3. 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  4. Approximately 50-70% stenosis of the proximal left internal carotid  artery due to atherosclerosis.    Findings were discussed with [place] FAYE of the emergency  department at approximately 11:27 AM on 11/14/2022.    LEXI EDUARDO MD         SYSTEM ID:  [id number]   MR Brain w/o & w Contrast    Narrative    MRI BRAIN WITHOUT AND WITH CONTRAST  11/14/2022 1:33 PM     HISTORY: Expressive aphasia and left facial droop.     TECHNIQUE: Multiplanar, multisequence MRI of the brain without and  with 6.5mL Gadavist.     COMPARISON: CT head dated 11/14/2022. MRI head dated 7/4/2015.    FINDINGS: Findings concerning for a punctate focus of restricted  diffusion localizing to the pars opercularis of the right inferior  frontal gyrus (series 3 image 55, series 4 image 19) without  definitive corresponding abnormal signal on the T2 FLAIR weighted  sequence, concerning for small focus of hyperacute versus acute  ischemia/infarct. No other areas of abnormal intracranial restricted  diffusion are identified.    The ventricles are normal in size and configuration. There is mild  generalized brain parenchymal volume loss. Small chronic infarct  versus  dilated perivascular space in the right basal ganglia/anterior  limb internal capsule region (series 6 image 18). Dilated perivascular  spaces favored over small chronic infarcts in the left basal ganglia  region. Mild to moderate scattered patchy nonspecific T2 FLAIR  hyperintense signal in the cerebral white matter, most likely  representing chronic small vessel ischemic change. Small focus of  susceptibility related signal loss involving the cortex of the lateral  left precentral gyrus, concerning for probable chronic microhemorrhage  (series 7 image 20). Otherwise, no intracranial hemorrhage, extra  axial fluid collection, or mass effect. No intracranial mass or  abnormal enhancement identified. The major arterial flow voids of the  skull base are grossly maintained.    Orbits appear within normal limits, accounting for limitations of  technique. There is mucosal thickening versus retention cyst or polyp  in the right sphenoid sinus, as before. The calvarium, skull base, and  midface otherwise appear unremarkable.      Impression    IMPRESSION:  1. Findings concerning for a punctate focus of hyperacute versus acute  ischemia/infarct involving the right inferior frontal gyrus.  2. Otherwise, no acute intracranial process.  3. Brain atrophy and presumed chronic small vessel ischemic changes,  as described.  4. Chronic cortical microhemorrhage in the lateral aspect of the left  precentral gyrus.    LEXI EDUARDO MD         SYSTEM ID:  [id number]   Echocardiogram Complete - For age > 60 yrs     Value    LVEF  60-65%    Narrative    [id number]  [id number]  [id number]  [id number]^GABRIELLA^JANINA^[facility]  Echocardiography Laboratory  [address] [place] [address]     Name: NING GREGORIO  MRN: [id number]  : 1940  Study Date: 11/15/2022 09:43 AM  Age: 82 yrs  Gender: Female  Patient Location: [facility]  Reason For Study: Cerebrovascular Incident  Ordering Physician: JANINA QUIROZ  OLIVAS  Performed By: Addie Soliman     BSA: 1.6 m2  Height: 60 in  Weight: 141 lb  BP: 189/80 mmHg  ______________________________________________________________________________  Procedure  Complete Portable Echo Adult.  ______________________________________________________________________________  Interpretation Summary     1. The left ventricle is normal in size. There is normal left ventricular wall  thickness. Left ventricular systolic function is normal. The visual ejection  fraction is 60-65%. Diastolic Doppler findings (E/E' ratio and/or other  parameters) suggest left ventricular filling pressures are indeterminate. No  regional wall motion abnormalities noted.  2. The right ventricle is normal size. The right ventricular systolic function  is normal.  3. Trace to mild mitral and tricuspid regurgitation.  4. No pericardial effusion.  5. No previous study for comparison.  ______________________________________________________________________________  Left Ventricle  The left ventricle is normal in size. There is normal left ventricular wall  thickness. Left ventricular systolic function is normal. The visual ejection  fraction is 60-65%. Diastolic Doppler findings (E/E' ratio and/or other  parameters) suggest left ventricular filling pressures are indeterminate. No  regional wall motion abnormalities noted.     Right Ventricle  The right ventricle is normal size. The right ventricular systolic function is  normal.     Atria  Normal left atrial size. Right atrial size is normal. There is no color  Doppler evidence of an atrial shunt.     Mitral Valve  There is trace mitral regurgitation.     Tricuspid Valve  There is trace to mild tricuspid regurgitation. The right ventricular systolic  pressure is approximated at 26.0 mmHg plus the right atrial pressure.     Aortic Valve  There is mild trileaflet aortic sclerosis. There is trace aortic  regurgitation. No aortic stenosis is present.     Pulmonic Valve  There is  no pulmonic valvular stenosis.     Vessels  The aortic root is normal size. Descending aortic velocity normal. The  inferior vena cava is normal.     Pericardium  There is no pericardial effusion.     Rhythm  Sinus rhythm was noted.  ______________________________________________________________________________  MMode/2D Measurements & Calculations  IVSd: 0.75 cm  LVIDd: 3.8 cm  LVIDs: 2.4 cm  LVPWd: 0.62 cm  FS: 35.9 %  LV mass(C)d: 69.9 grams  LV mass(C)dI: 43.5 grams/m2  Ao root diam: 2.4 cm  asc Aorta Diam: 2.5 cm  LVOT diam: 1.9 cm  LVOT area: 2.8 cm2  LA Volume (BP): 37.0 ml  LA Volume Index (BP): 23.0 ml/m2     RWT: 0.33     Doppler Measurements & Calculations  MV E max merari: 87.8 cm/sec  MV A max merari: 93.8 cm/sec  MV E/A: 0.94  MV dec time: 0.27 sec  PA acc time: 0.15 sec  TR max merari: 255.0 cm/sec  TR max P.0 mmHg  E/E' av.6  Lateral E/e': 14.7  Medial E/e': 10.6     ______________________________________________________________________________  Report approved by: Kaye Reyes 11/15/2022 10:54 AM               Discharge Medications   Current Discharge Medication List      START taking these medications    Details   aspirin (ASA) 325 MG EC tablet Take 1 tablet  by mouth daily  Qty: 30 tablet, Refills: 3    Associated Diagnoses: Cerebrovascular accident (CVA), unspecified mechanism (H)      atorvastatin (LIPITOR) 40 MG tablet Take 1 tablet (40 mg) by mouth every evening  Qty: 30 tablet, Refills: 0    Associated Diagnoses: Cerebrovascular accident (CVA), unspecified mechanism (H)      clopidogrel (PLAVIX) 75 MG tablet 1 tablet (75 mg) by Oral or NG Tube route daily  Qty: 30 tablet, Refills: 0    Associated Diagnoses: Cerebrovascular accident (CVA), unspecified mechanism (H)      lisinopril (ZESTRIL) 10 MG tablet Take 1 tablet (10 mg) by mouth daily  Qty: 30 tablet, Refills: 0    Associated Diagnoses: Benign essential hypertension         CONTINUE these medications which have NOT CHANGED    Details    cyanocobalamin 1000 MCG/ML injection Inject 1 mL as directed every 30 days.      ESTRADIOL PO Take 0.5 mg by mouth twice a week Wed/Sat      rabeprazole (ACIPHEX) 20 MG tablet Take 1 tablet by mouth daily.  Qty: 90 tablet, Refills: 3    Comments: 2nd response pls check your eprescribe before calling  Associated Diagnoses: GERD (gastroesophageal reflux disease)      triamcinolone (KENALOG) 0.1 % external cream Apply topically as needed for irritation      vitamin D3 (CHOLECALCIFEROL) 50 mcg (2000 units) tablet Take 1 tablet by mouth daily         STOP taking these medications       simvastatin (ZOCOR) 20 MG tablet Comments:   Reason for Stopping: change to lipitor            Allergies   Allergies   Allergen Reactions     Compazine [Prochlorperazine]      Septra [Sulfamethoxazole W/Trimethoprim]      Zithromax [Azithromycin]

## 2022-11-15 NOTE — PLAN OF CARE
Pt A&O x4, up with SBA. VSS, BP elevated- permissive htn allowed. New lisinopril added. Tele stroke consult this morning, see neurology note. No deficits observed, neuro's intact. Plan to discharge home with cardiac monitor and follow up in clinic.   Addendum 1450: BP continues to be elevated, MD aware, holding discharge until tomorrow r/t BP.   PRIMARY DIAGNOSIS:  NURSING  OUTPATIENT/OBSERVATION GOALS TO BE MET BEFORE DISCHARGE:  1. ADLs back to baseline: Yes    2. Activity and level of assistance: Ambulating independently.    3. Pain status: Pain free.    4. Return to near baseline physical activity: Yes     Discharge Planner Nurse   Safe discharge environment identified: Yes  Barriers to discharge: Yes       Entered by: Shabnam Guo RN 11/15/2022 1:27 PM     Please review provider order for any additional goals.   Nurse to notify provider when observation goals have been met and patient is ready for discharge.Goal Outcome Evaluation:

## 2022-11-16 ENCOUNTER — APPOINTMENT (OUTPATIENT)
Dept: MRI IMAGING | Facility: CLINIC | Age: 82
End: 2022-11-16
Attending: EMERGENCY MEDICINE
Payer: MEDICARE

## 2022-11-16 ENCOUNTER — APPOINTMENT (OUTPATIENT)
Dept: CT IMAGING | Facility: CLINIC | Age: 82
End: 2022-11-16
Attending: EMERGENCY MEDICINE
Payer: MEDICARE

## 2022-11-16 ENCOUNTER — HOSPITAL ENCOUNTER (EMERGENCY)
Facility: CLINIC | Age: 82
Discharge: HOME | End: 2022-11-16
Attending: EMERGENCY MEDICINE | Admitting: EMERGENCY MEDICINE
Payer: MEDICARE

## 2022-11-16 ENCOUNTER — NURSE TRIAGE (OUTPATIENT)
Dept: NURSING | Facility: CLINIC | Age: 82
End: 2022-11-16

## 2022-11-16 ENCOUNTER — APPOINTMENT (OUTPATIENT)
Dept: CARDIOLOGY | Facility: CLINIC | Age: 82
DRG: 066 | End: 2022-11-16
Attending: INTERNAL MEDICINE
Payer: MEDICARE

## 2022-11-16 VITALS
HEART RATE: 57 BPM | OXYGEN SATURATION: 98 % | WEIGHT: 135.4 LBS | SYSTOLIC BLOOD PRESSURE: 168 MMHG | RESPIRATION RATE: 18 BRPM | DIASTOLIC BLOOD PRESSURE: 65 MMHG | TEMPERATURE: 97.6 F | BODY MASS INDEX: 25.58 KG/M2

## 2022-11-16 VITALS
TEMPERATURE: 98 F | DIASTOLIC BLOOD PRESSURE: 78 MMHG | OXYGEN SATURATION: 98 % | RESPIRATION RATE: 18 BRPM | HEART RATE: 62 BPM | SYSTOLIC BLOOD PRESSURE: 188 MMHG

## 2022-11-16 DIAGNOSIS — Z86.73 HISTORY OF CVA (CEREBROVASCULAR ACCIDENT): ICD-10-CM

## 2022-11-16 DIAGNOSIS — R20.2 FACIAL PARESTHESIA: ICD-10-CM

## 2022-11-16 DIAGNOSIS — R03.0 ELEVATED BLOOD PRESSURE READING: ICD-10-CM

## 2022-11-16 LAB
ANION GAP SERPL CALCULATED.3IONS-SCNC: 11 MMOL/L (ref 7–15)
BASOPHILS # BLD AUTO: 0.1 10E3/UL (ref 0–0.2)
BASOPHILS NFR BLD AUTO: 1 %
BUN SERPL-MCNC: 22 MG/DL (ref 8–23)
CALCIUM SERPL-MCNC: 9.1 MG/DL (ref 8.8–10.2)
CHLORIDE SERPL-SCNC: 103 MMOL/L (ref 98–107)
CREAT SERPL-MCNC: 0.93 MG/DL (ref 0.51–0.95)
DEPRECATED HCO3 PLAS-SCNC: 25 MMOL/L (ref 22–29)
EOSINOPHIL # BLD AUTO: 0.2 10E3/UL (ref 0–0.7)
EOSINOPHIL NFR BLD AUTO: 2 %
ERYTHROCYTE [DISTWIDTH] IN BLOOD BY AUTOMATED COUNT: 12.8 % (ref 10–15)
GFR SERPL CREATININE-BSD FRML MDRD: 61 ML/MIN/1.73M2
GLUCOSE SERPL-MCNC: 114 MG/DL (ref 70–99)
HCT VFR BLD AUTO: 36.6 % (ref 35–47)
HGB BLD-MCNC: 11.8 G/DL (ref 11.7–15.7)
HOLD SPECIMEN: NORMAL
HOLD SPECIMEN: NORMAL
IMM GRANULOCYTES # BLD: 0 10E3/UL
IMM GRANULOCYTES NFR BLD: 0 %
LYMPHOCYTES # BLD AUTO: 1.7 10E3/UL (ref 0.8–5.3)
LYMPHOCYTES NFR BLD AUTO: 18 %
MCH RBC QN AUTO: 31.3 PG (ref 26.5–33)
MCHC RBC AUTO-ENTMCNC: 32.2 G/DL (ref 31.5–36.5)
MCV RBC AUTO: 97 FL (ref 78–100)
MONOCYTES # BLD AUTO: 0.8 10E3/UL (ref 0–1.3)
MONOCYTES NFR BLD AUTO: 9 %
NEUTROPHILS # BLD AUTO: 6.5 10E3/UL (ref 1.6–8.3)
NEUTROPHILS NFR BLD AUTO: 70 %
NRBC # BLD AUTO: 0 10E3/UL
NRBC BLD AUTO-RTO: 0 /100
PLATELET # BLD AUTO: 295 10E3/UL (ref 150–450)
POTASSIUM SERPL-SCNC: 4 MMOL/L (ref 3.4–5.3)
RBC # BLD AUTO: 3.77 10E6/UL (ref 3.8–5.2)
SODIUM SERPL-SCNC: 139 MMOL/L (ref 136–145)
TROPONIN T SERPL HS-MCNC: 15 NG/L
WBC # BLD AUTO: 9.2 10E3/UL (ref 4–11)

## 2022-11-16 PROCEDURE — 250N000013 HC RX MED GY IP 250 OP 250 PS 637: Performed by: INTERNAL MEDICINE

## 2022-11-16 PROCEDURE — 250N000011 HC RX IP 250 OP 636: Performed by: EMERGENCY MEDICINE

## 2022-11-16 PROCEDURE — 99239 HOSP IP/OBS DSCHRG MGMT >30: CPT | Performed by: INTERNAL MEDICINE

## 2022-11-16 PROCEDURE — 84484 ASSAY OF TROPONIN QUANT: CPT | Performed by: EMERGENCY MEDICINE

## 2022-11-16 PROCEDURE — 36415 COLL VENOUS BLD VENIPUNCTURE: CPT | Performed by: EMERGENCY MEDICINE

## 2022-11-16 PROCEDURE — 93248 EXT ECG>7D<15D REV&INTERPJ: CPT | Performed by: INTERNAL MEDICINE

## 2022-11-16 PROCEDURE — 80048 BASIC METABOLIC PNL TOTAL CA: CPT | Performed by: EMERGENCY MEDICINE

## 2022-11-16 PROCEDURE — 250N000013 HC RX MED GY IP 250 OP 250 PS 637: Performed by: PHYSICIAN ASSISTANT

## 2022-11-16 PROCEDURE — 250N000013 HC RX MED GY IP 250 OP 250 PS 637: Performed by: EMERGENCY MEDICINE

## 2022-11-16 PROCEDURE — 70498 CT ANGIOGRAPHY NECK: CPT | Mod: MA

## 2022-11-16 PROCEDURE — 93005 ELECTROCARDIOGRAM TRACING: CPT

## 2022-11-16 PROCEDURE — 85025 COMPLETE CBC W/AUTO DIFF WBC: CPT | Performed by: EMERGENCY MEDICINE

## 2022-11-16 PROCEDURE — 250N000009 HC RX 250: Performed by: EMERGENCY MEDICINE

## 2022-11-16 PROCEDURE — 255N000002 HC RX 255 OP 636: Performed by: EMERGENCY MEDICINE

## 2022-11-16 PROCEDURE — 70553 MRI BRAIN STEM W/O & W/DYE: CPT | Mod: MG

## 2022-11-16 PROCEDURE — 93242 EXT ECG>48HR<7D RECORDING: CPT

## 2022-11-16 PROCEDURE — 99285 EMERGENCY DEPT VISIT HI MDM: CPT | Mod: 25

## 2022-11-16 PROCEDURE — A9585 GADOBUTROL INJECTION: HCPCS | Performed by: EMERGENCY MEDICINE

## 2022-11-16 PROCEDURE — G1010 CDSM STANSON: HCPCS

## 2022-11-16 PROCEDURE — 70496 CT ANGIOGRAPHY HEAD: CPT | Mod: MA

## 2022-11-16 RX ORDER — IOPAMIDOL 755 MG/ML
500 INJECTION, SOLUTION INTRAVASCULAR ONCE
Status: COMPLETED | OUTPATIENT
Start: 2022-11-16 | End: 2022-11-16

## 2022-11-16 RX ORDER — LISINOPRIL 10 MG/1
10 TABLET ORAL ONCE
Status: COMPLETED | OUTPATIENT
Start: 2022-11-16 | End: 2022-11-16

## 2022-11-16 RX ORDER — METOPROLOL TARTRATE 25 MG/1
25 TABLET, FILM COATED ORAL 2 TIMES DAILY
Qty: 60 TABLET | Refills: 0 | Status: SHIPPED | OUTPATIENT
Start: 2022-11-16

## 2022-11-16 RX ORDER — GADOBUTROL 604.72 MG/ML
6 INJECTION INTRAVENOUS ONCE
Status: COMPLETED | OUTPATIENT
Start: 2022-11-16 | End: 2022-11-16

## 2022-11-16 RX ORDER — LORAZEPAM 1 MG/1
1 TABLET ORAL ONCE
Status: COMPLETED | OUTPATIENT
Start: 2022-11-16 | End: 2022-11-16

## 2022-11-16 RX ADMIN — GADOBUTROL 6 ML: 604.72 INJECTION INTRAVENOUS at 20:47

## 2022-11-16 RX ADMIN — CLOPIDOGREL BISULFATE 75 MG: 75 TABLET ORAL at 09:09

## 2022-11-16 RX ADMIN — ASPIRIN 325 MG ORAL TABLET 325 MG: 325 PILL ORAL at 09:08

## 2022-11-16 RX ADMIN — METOPROLOL TARTRATE 25 MG: 25 TABLET, FILM COATED ORAL at 09:08

## 2022-11-16 RX ADMIN — LISINOPRIL 10 MG: 10 TABLET ORAL at 09:09

## 2022-11-16 RX ADMIN — SODIUM CHLORIDE 80 ML: 9 INJECTION, SOLUTION INTRAVENOUS at 21:24

## 2022-11-16 RX ADMIN — PANTOPRAZOLE SODIUM 40 MG: 40 TABLET, DELAYED RELEASE ORAL at 09:09

## 2022-11-16 RX ADMIN — LISINOPRIL 10 MG: 10 TABLET ORAL at 23:14

## 2022-11-16 RX ADMIN — IOPAMIDOL 75 ML: 755 INJECTION, SOLUTION INTRAVENOUS at 21:23

## 2022-11-16 RX ADMIN — LORAZEPAM 1 MG: 1 TABLET ORAL at 20:02

## 2022-11-16 ASSESSMENT — ACTIVITIES OF DAILY LIVING (ADL)
ADLS_ACUITY_SCORE: 20
ADLS_ACUITY_SCORE: 20
ADLS_ACUITY_SCORE: 35
ADLS_ACUITY_SCORE: 20
ADLS_ACUITY_SCORE: 35

## 2022-11-16 ASSESSMENT — ENCOUNTER SYMPTOMS: WEAKNESS: 0

## 2022-11-16 NOTE — PLAN OF CARE
Temp: 98.2  F (36.8  C) Temp src: Oral BP: (!) 176/70 Pulse: 61   Resp: 19 SpO2: 95 % O2 Device: None (Room air)       Orientation:  x4, very pleasant   VS: stable, BP elevated.   Pain:  denies any pain   Tele:  SR  Activity:  ambulates independently in room, stating she dose a lot of exercises in the room   Resp:   no SOB, no coughing reported   GI:  ate her meals, BM earlier today, no discomfort   : voids spontaneously   Lines: PIV leaking, changed dressing, patent   Plan:   possible discharge tomorrow, continue with plan of care

## 2022-11-16 NOTE — DISCHARGE SUMMARY
[facility]  Hospitalist Discharge Summary      Date of Admission:  11/14/2022  Date of Discharge:  11/15/2022  Discharging Provider: Eriberto Torre MD, MD  Discharge Service: Hospitalist Service    Discharge Diagnoses   Acute ischemic stroke of the right inferior frontal gyrus.  Embolic stroke of undetermined source  Newly diagnosed with benign essential hypertension  Dyslipidemia  Carotid artery stenosis    Follow-ups Needed After Discharge   Follow-up Appointments     Follow-up and recommended labs and tests       Follow up with primary care provider, Angel Zapata, within   7 days to evaluate medication change, to evaluate treatment change, and   for hospital follow- up.  The following labs/tests are recommended:   Patient will be discharged with cardiac monitoring.  Please monitor your blood pressure levels at home and bring your   monitoring log upon follow-up.  You will need to follow-up with neurology for your recent CVA.             Unresulted Labs Ordered in the Past 30 Days of this Admission     No orders found from 10/15/2022 to 11/15/2022.      These results will be followed up by PCP    Discharge Disposition   Discharged to home  Condition at discharge: Stable        Addendum: Her planned discharge did not materialize yesterday 11/15/2022 due to still showing elevated, severe uncontrolled hypertension.  Initiated lisinopril at 10 mg daily at the same time another agent with metoprolol 25 mg twice daily.  Overnight she started to show some improvement with decreasing trend but not normalizing yet.  This is reasonable given she presented here with severe uncontrolled hypertension with systolic blood pressure greater than 220 and diastolic blood pressure greater than 110.  Likely this is one of the contributing factors for her recent CVA.  Initially permissive hypertension was pursued but now further blood pressure optimization is being pursued.  Anticipating that  her blood pressure continues to improve in the next coming days once she has been on several more days of oral antihypertensives.  She needs to check her blood pressure levels at home, instructions provided regarding holding her medications with parameters.          I assume service care today.  Seen and examined.  Chart reviewed.  I met this very pleasant lady this morning while she is laying comfortably in bed and eating her breakfast tray.  She mentioned that she is feeling much improved and better.  Feeling back to her baseline state.  No further issues with aphasia, no mental status changes.  Denies any focal weakness nor numbness or tingling sensation.  Seen by stroke neurology service.  Current recommendations to continue dual antiplatelets with Plavix and aspirin for the next 90 days and then switch to full dose of aspirin after 90 days.  Change her statins from Zocor to Lipitor at 40 mg at bedtime.  Started her on antihypertensives as she presented here with severe uncontrolled hypertension.  She is not on any maintenance medications prior to this hospitalization.  She will be initiated on lisinopril 10 mg here and will be continued upon discharge.  This can be further titrated and even adding another agent if blood pressure levels not significantly improve upon follow-up.  She is wanting for home discharge later today if she continues to show improvement and remained stable.  She has a pending carotid artery ultrasound and echocardiogram.    She has no aspiration-like symptoms and tolerating oral diet.    Addendum: Discharge plans deferred due to severe uncontrolled BP levels.    I will refer you to excerpts of my colleagues prior documentation with their admission H&P as listed below for other details of her hospital stay.      Hospital Course   Candy Garsia is a 82 year old female with PMHx significant for HLD, who was admitted on 11/14/2022 after presenting for evaluation of transient speech changes,  concern for stroke.    In the ED hypertensive.  NIH 1 on initial codes stroke exam per report very subtle left facial droop, although may be her baseline. Euglycemic. Labs including cbc, bmp, coags & high sensitive Tn I unremarkable. EKG not obtained in ED.   CTA head/neck: severe stenosis vs subtotal occlusion of right M2, plaque with 50-70% stenosis of left ICA, mild plaque/calcification in right ICA stenosis. Read as concerning for fibromuscular dysplasia involving bilateral ICA, MRI pending.      On my assessment speech normalized, per patient while in the ED around 11:30-12PM while waiting for imaging. She denies any other symptoms such as visual disturbance, paraesthesias, weakness in limbs. No stroke, arrhythmia, bleeding disorder history. No headache. No recent medication changes.     Discussed with Dr. Blanc in the ED, full chart review including lab work, imaging, and vital signs were reviewed. Patient received DAPT in the ED. Dr Blanc spoke with Dr Hernández of Vascular Surgery and Vascular Neurology. Admission was requested to observation from hospitalist service for monitoring if no intervention pending MRI.     CVA   Carotid Artery Stenosis  HLD  Pt presented with abrupt onsest expressive aphasia, weakness across L CN VII all resolved. Evaluated code stroke. Noncontrast CT scan was unremarkable but CTA revealed some vascular abnormalities.  Code stroke de escalated, not a TPA candidate. Loaded with DAPT followed by MRI. MRI concerning for acute ischemia/infarct involving the right inferior frontal gyrus. Also noted Chronic cortical microhemorrhage in the lateral aspect of the left  precentral gyrus.  - Vascular Surgery, Dr Hernández was contacted regarding MRI - did not recommend transfer or vascular surgery intervention regarding carotid stenosis.   - admit obs with neuro monitoring and Ischemic Stroke order set. Discontinue ancillary consults as no needs identified.  - ekg pending, tele monitoring  [facility]   - Neurochecks   - DAPT: ASA 325mg PO x1 now, followed by 325mg daily, Plavix 300mg Po x1 now, followed by 75mg daily. No contraindications to anticoagulation or DAPT identified.   - TTE, lipid panel, A1c  - continue nightly statin  - permissive HTN with PRN labetalol ordered  - anticipate discharge to home 11/15 with outpt follow up - Vascular Neurology, Vascular Surgery regarding 50-70% stenosis of the proximal left internal carotid artery due to atherosclerosis.    DVT Prophylaxis: Pneumatic Compression Devices  Code Status: Full Code as discussed on admission     Expected Discharge Date: 11/15/2022               Family Updated with Plan of Care: family at bedside in ED updated with POC.     Alayna Begum PA-C      Consultations This Hospital Stay   PATIENT LEARNING CENTER IP CONSULT  NEUROLOGY IP STROKE CONSULT  PHARMACY IP CONSULT  PHARMACY IP CONSULT    Code Status   Full Code    Time Spent on this Encounter   I, Eriberto Torre MD, MD, personally saw the patient today and spent greater than 30 minutes discharging this patient.       Eriberto Torre MD, MD  [address] SURGICAL  [address] [place] [address]  Phone: [phone]  Fax: [phone]  ______________________________________________________________________    Physical Exam   Vital Signs: Temp: 97.8  F (36.6  C) Temp src: Oral BP: (!) 170/74 Pulse: 60   Resp: 18 SpO2: 99 % O2 Device: None (Room air)    Weight: 135 lbs 6.4 oz  HEENT; Atraumatic, normocephalic, pinkish conjuctiva, pupils bilateral reactive   Skin: warm and moist, no rashes  Lungs: equal chest expansion, clear to auscultation, no wheezes, no stridor, no crackles,   Heart: normal rate, normal rhythm, no rubs or gallops.   Abdomen: normal bowel sounds, no tenderness, no peritoneal signs, no guarding  Extremities: no deformities, no edema   Neuro; follow commands, alert and oriented x3, spontaneous speech, coherent, moves all  extremities spontaneously  Psych; no hallucination, euthymic mood, not agitated           Primary Care Physician   Angel Zapata    Discharge Orders      Reason for your hospital stay    Admitted the hospital for acute CVA found with severe uncontrolled hypertension.     Follow-up and recommended labs and tests     Follow up with primary care provider, Angel Zapata, within 7 days to evaluate medication change, to evaluate treatment change, and for hospital follow- up.  The following labs/tests are recommended: Patient will be discharged with cardiac monitoring.  Please monitor your blood pressure levels at home and bring your monitoring log upon follow-up.  You will need to follow-up with neurology for your recent CVA.     Activity    Your activity upon discharge: activity as tolerated     Full Code     Diet    Follow this diet upon discharge: Orders Placed This Encounter      Regular Diet Adult     Stroke Hospital Follow Up    [facility] [place] will call you to coordinate care as prescribed by your provider. If you don t hear from a representative within 2 business days, please call [phone].         Significant Results and Procedures   Most Recent 3 CBC's:  Recent Labs   Lab Test 11/15/22  0648 11/14/22  1100 07/04/15  1405   WBC 7.4 8.1 6.1   HGB 11.3* 12.3 12.4   MCV 99 99 94    301 264     Most Recent 3 BMP's:  Recent Labs   Lab Test 11/15/22  0648 11/14/22  1846 11/14/22  1102 11/14/22  1100 07/04/15  1405     --   --  142 142   POTASSIUM 3.6  --   --  3.7 3.4   CHLORIDE 106  --   --  105 107   CO2 28  --   --  27 26   BUN 19.2  --   --  15.8 14   CR 0.85  --   --  0.87 0.73   ANIONGAP 8  --   --  10 9   CHERI 9.0  --   --  9.7 8.4*   GLC 87 104* 104* 108* 98     Most Recent 3 INR's:  Recent Labs   Lab Test 11/14/22  1100   INR 1.05     Most Recent 3 Troponin's:No lab results found.  7-Day Micro Results     Collected Updated Procedure Result Status      11/14/2022  1406 11/14/2022 1517 Asymptomatic COVID-19 Virus (Coronavirus) by PCR Nasopharyngeal [[id number]]    Swab from Nasopharyngeal    Final result Component Value   SARS CoV2 PCR Negative   NEGATIVE: SARS-CoV-2 (COVID-19) RNA not detected, presumed negative.                Most Recent Hemoglobin A1c:  Recent Labs   Lab Test 11/14/22  1100   A1C 5.4     Most Recent 6 glucoses:  Recent Labs   Lab Test 11/15/22  0648 11/14/22  1846 11/14/22  1102 11/14/22  1100 07/04/15  1405   GLC 87 104* 104* 108* 98   ,   Results for orders placed or performed during the hospital encounter of 11/14/22   CT Head w/o Contrast    Narrative    CT SCAN OF THE HEAD WITHOUT CONTRAST   11/14/2022 11:10 AM     HISTORY: Stroke code. Left-sided facial droop and word finding  difficulties.    TECHNIQUE:  Axial images of the head and coronal reformations without  IV contrast material. Radiation dose for this scan was reduced using  automated exposure control, adjustment of the mA and/or kV according  to patient size, or iterative reconstruction technique.    COMPARISON: MRI of the brain dated 7/4/2015.    FINDINGS: There is no evidence of intracranial hemorrhage, mass, acute  infarct or anomaly. The ventricles are normal in size, shape and  configuration. Mild diffuse parenchymal volume loss. Mild patchy  periventricular white matter hypodensities which are nonspecific, but  likely related to chronic microvascular ischemic disease. Small focus  of hypoattenuation along the anterior limb of the left internal  capsule which may represent a dilated perivascular space or small  chronic infarct/chronic small vessel ischemic change.    The visualized portions of the sinuses and mastoids appear normal. The  bony calvarium and bones of the skull base appear intact.       Impression    IMPRESSION:  1. No acute intracranial hemorrhage, extra-axial fluid collection, or  mass effect.  2. Mild scattered patchy hypoattenuation in the cerebral white matter,  as  described. This may represent sequela of chronic ischemic change.  No large confluent territorial infarction identified on CT.  3. Mild generalized brain parenchymal volume loss.    LEXI EDUARDO MD         SYSTEM ID:  [id number]   CTA Head Neck w Contrast    Narrative    CT ANGIOGRAM OF THE HEAD AND NECK WITH CONTRAST  11/14/2022 11:17 AM     HISTORY: Code stroke. Left-sided facial droop, word-finding  difficulties.    TECHNIQUE:  CT angiography with an injection of 75 mL [id number]-370 IV  with scans through the head and neck. Images were transferred to a  separate 3-D workstation where multiplanar reformations and 3-D images  were created. Estimates of carotid stenoses are made relative to the  distal internal carotid artery diameters except as noted. Radiation  dose for this scan was reduced using automated exposure control,  adjustment of the mA and/or kV according to patient size, or iterative  reconstruction technique.    COMPARISON: CT head of same day. MR angiogram of the head and neck  dated 7/4/2015.     CT ANGIOGRAM HEAD FINDINGS: There is atherosclerotic disease involving  the bilateral carotid siphons with up to moderate stenosis of the  paraclinoid segment of the right internal carotid artery. Otherwise,  no significant stenosis of the intracranial internal carotid arteries.  Developmentally hypoplastic or absent A1 segment of the right anterior  cerebral artery, a normal variant. No high-grade stenosis of the  proximal branches of the anterior cerebral arteries is noted.    There appears to be severe stenosis/subtotal occlusion of an 8 mm  length segment of an M2 branch of the right middle cerebral artery  (series 5 images 355-363, series 1002 image 3) arising from the  dominant anterior division branch and extending anterolaterally along  the anterior aspect of the right sylvian fissure (series 8 image 41,  series 10 image 19). This branch traverses anterior to the right  temporal lobe and is along the  posterior margin of the right inferior  frontal gyrus. Mild to moderate/moderate focal stenosis of the more  proximal aspect of that same M2 branch of the right middle cerebral  artery (series 1002 image 1). Otherwise, no high-grade stenosis  involving the major proximal branches of the right middle cerebral  artery is identified. No significant stenosis or large vessel  occlusion of the major proximal branches of the left middle cerebral  artery.    The bilateral vertebral arteries, the basilar artery, and the proximal  major branches of the posterior cervical arteries are patent. No  intracranial aneurysm or high flow vascular malformation is  identified.    CT ANGIOGRAM NECK FINDINGS:   Normal origin of the great vessels from the aortic arch.     Right carotid artery: There is mild atherosclerosis of the carotid  bifurcation without significant stenosis. There is tandem luminal  irregularity involving the mid to distal cervical internal carotid  artery, concerning for fibromuscular dysplasia.    Left carotid artery: There is atherosclerotic disease that is  predominantly calcified involving the carotid bifurcation and proximal  internal carotid artery resulting in 50-70% stenosis by NASCET  criteria.    Mild luminal irregularity involving the mid cervical left internal  carotid artery, concerning for fibromuscular dysplasia.    Vertebral arteries: Vertebral arteries are patent without evidence of  dissection. No significant stenosis.     Other findings: Multilevel degenerative changes of the cervical spine.      Impression    IMPRESSION:  1. Segmental severe stenosis versus subtotal occlusion of an M2 branch  of the right middle cerebral artery, as described.  2. Otherwise, no high-grade stenosis or large vessel occlusion of the  major intracranial arteries.  3. 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  4. Approximately 50-70% stenosis of the proximal  left internal carotid  artery due to atherosclerosis.    Findings were discussed with [place] FAYE of the emergency  department at approximately 11:27 AM on 11/14/2022.    LEXI EDUARDO MD         SYSTEM ID:  [id number]   MR Brain w/o & w Contrast    Narrative    MRI BRAIN WITHOUT AND WITH CONTRAST  11/14/2022 1:33 PM     HISTORY: Expressive aphasia and left facial droop.     TECHNIQUE: Multiplanar, multisequence MRI of the brain without and  with 6.5mL Gadavist.     COMPARISON: CT head dated 11/14/2022. MRI head dated 7/4/2015.    FINDINGS: Findings concerning for a punctate focus of restricted  diffusion localizing to the pars opercularis of the right inferior  frontal gyrus (series 3 image 55, series 4 image 19) without  definitive corresponding abnormal signal on the T2 FLAIR weighted  sequence, concerning for small focus of hyperacute versus acute  ischemia/infarct. No other areas of abnormal intracranial restricted  diffusion are identified.    The ventricles are normal in size and configuration. There is mild  generalized brain parenchymal volume loss. Small chronic infarct  versus dilated perivascular space in the right basal ganglia/anterior  limb internal capsule region (series 6 image 18). Dilated perivascular  spaces favored over small chronic infarcts in the left basal ganglia  region. Mild to moderate scattered patchy nonspecific T2 FLAIR  hyperintense signal in the cerebral white matter, most likely  representing chronic small vessel ischemic change. Small focus of  susceptibility related signal loss involving the cortex of the lateral  left precentral gyrus, concerning for probable chronic microhemorrhage  (series 7 image 20). Otherwise, no intracranial hemorrhage, extra  axial fluid collection, or mass effect. No intracranial mass or  abnormal enhancement identified. The major arterial flow voids of the  skull base are grossly maintained.    Orbits appear within normal limits, accounting for  limitations of  technique. There is mucosal thickening versus retention cyst or polyp  in the right sphenoid sinus, as before. The calvarium, skull base, and  midface otherwise appear unremarkable.      Impression    IMPRESSION:  1. Findings concerning for a punctate focus of hyperacute versus acute  ischemia/infarct involving the right inferior frontal gyrus.  2. Otherwise, no acute intracranial process.  3. Brain atrophy and presumed chronic small vessel ischemic changes,  as described.  4. Chronic cortical microhemorrhage in the lateral aspect of the left  precentral gyrus.    LEXI EDUARDO MD         SYSTEM ID:  [id number]   Echocardiogram Complete - For age > 60 yrs     Value    LVEF  60-65%    Narrative    [id number]  [id number]  [id number]  [id number]^GABRIELLA^JANINA^DEISY     [facility]  Echocardiography Laboratory  [address] [place] [address]     Name: NING GREGORIO  MRN: [id number]  : 1940  Study Date: 11/15/2022 09:43 AM  Age: 82 yrs  Gender: Female  Patient Location: [facility]  Reason For Study: Cerebrovascular Incident  Ordering Physician: JANINA QUIROZ  Performed By: Addie Soliman     BSA: 1.6 m2  Height: 60 in  Weight: 141 lb  BP: 189/80 mmHg  ______________________________________________________________________________  Procedure  Complete Portable Echo Adult.  ______________________________________________________________________________  Interpretation Summary     1. The left ventricle is normal in size. There is normal left ventricular wall  thickness. Left ventricular systolic function is normal. The visual ejection  fraction is 60-65%. Diastolic Doppler findings (E/E' ratio and/or other  parameters) suggest left ventricular filling pressures are indeterminate. No  regional wall motion abnormalities noted.  2. The right ventricle is normal size. The right ventricular systolic function  is normal.  3. Trace to mild mitral and tricuspid regurgitation.  4. No  pericardial effusion.  5. No previous study for comparison.  ______________________________________________________________________________  Left Ventricle  The left ventricle is normal in size. There is normal left ventricular wall  thickness. Left ventricular systolic function is normal. The visual ejection  fraction is 60-65%. Diastolic Doppler findings (E/E' ratio and/or other  parameters) suggest left ventricular filling pressures are indeterminate. No  regional wall motion abnormalities noted.     Right Ventricle  The right ventricle is normal size. The right ventricular systolic function is  normal.     Atria  Normal left atrial size. Right atrial size is normal. There is no color  Doppler evidence of an atrial shunt.     Mitral Valve  There is trace mitral regurgitation.     Tricuspid Valve  There is trace to mild tricuspid regurgitation. The right ventricular systolic  pressure is approximated at 26.0 mmHg plus the right atrial pressure.     Aortic Valve  There is mild trileaflet aortic sclerosis. There is trace aortic  regurgitation. No aortic stenosis is present.     Pulmonic Valve  There is no pulmonic valvular stenosis.     Vessels  The aortic root is normal size. Descending aortic velocity normal. The  inferior vena cava is normal.     Pericardium  There is no pericardial effusion.     Rhythm  Sinus rhythm was noted.  ______________________________________________________________________________  MMode/2D Measurements & Calculations  IVSd: 0.75 cm  LVIDd: 3.8 cm  LVIDs: 2.4 cm  LVPWd: 0.62 cm  FS: 35.9 %  LV mass(C)d: 69.9 grams  LV mass(C)dI: 43.5 grams/m2  Ao root diam: 2.4 cm  asc Aorta Diam: 2.5 cm  LVOT diam: 1.9 cm  LVOT area: 2.8 cm2  LA Volume (BP): 37.0 ml  LA Volume Index (BP): 23.0 ml/m2     RWT: 0.33     Doppler Measurements & Calculations  MV E max merari: 87.8 cm/sec  MV A max merari: 93.8 cm/sec  MV E/A: 0.94  MV dec time: 0.27 sec  PA acc time: 0.15 sec  TR max merari: 255.0 cm/sec  TR max PG:  26.0 mmHg  E/E' av.6  Lateral E/e': 14.7  Medial E/e': 10.6     ______________________________________________________________________________  Report approved by: Kaye Reyes 11/15/2022 10:54 AM               Discharge Medications   Current Discharge Medication List      START taking these medications    Details   aspirin (ASA) 325 MG EC tablet Take 1 tablet  by mouth daily  Qty: 30 tablet, Refills: 3    Associated Diagnoses: Cerebrovascular accident (CVA), unspecified mechanism (H)      atorvastatin (LIPITOR) 40 MG tablet Take 1 tablet (40 mg) by mouth every evening  Qty: 30 tablet, Refills: 0    Associated Diagnoses: Cerebrovascular accident (CVA), unspecified mechanism (H)      clopidogrel (PLAVIX) 75 MG tablet 1 tablet (75 mg) by Oral or NG Tube route daily  Qty: 30 tablet, Refills: 0    Associated Diagnoses: Cerebrovascular accident (CVA), unspecified mechanism (H)      lisinopril (ZESTRIL) 10 MG tablet Take 1 tablet (10 mg) by mouth daily  Qty: 30 tablet, Refills: 0    Associated Diagnoses: Benign essential hypertension       Added metoprolol 25 mg twice daily, holding parameters provided earlier.      CONTINUE these medications which have NOT CHANGED    Details   cyanocobalamin 1000 MCG/ML injection Inject 1 mL as directed every 30 days.      ESTRADIOL PO Take 0.5 mg by mouth twice a week Wed/Sat      rabeprazole (ACIPHEX) 20 MG tablet Take 1 tablet by mouth daily.  Qty: 90 tablet, Refills: 3    Comments: 2nd response pls check your eprescribe before calling  Associated Diagnoses: GERD (gastroesophageal reflux disease)      triamcinolone (KENALOG) 0.1 % external cream Apply topically as needed for irritation      vitamin D3 (CHOLECALCIFEROL) 50 mcg (2000 units) tablet Take 1 tablet by mouth daily         STOP taking these medications       simvastatin (ZOCOR) 20 MG tablet Comments:   Reason for Stopping: change to lipitor            Allergies   Allergies   Allergen Reactions     Compazine  [Prochlorperazine]      Septra [Sulfamethoxazole W/Trimethoprim]      Zithromax [Azithromycin]

## 2022-11-16 NOTE — PROGRESS NOTES
Vitals: BP (!) 152/63 (BP Location: Right arm)   Pulse 58   Temp 97.9  F (36.6  C) (Oral)   Resp 18   Wt 61.4 kg (135 lb 6.4 oz)   LMP  (LMP Unknown)   SpO2 98%   BMI 25.58 kg/m      Neuro: A&Ox 4. Sensations and motor abilities intact  Resp: Clear, RA  Cardiac: Hypertension 150 SBP, tele SR  GI: WDL  : WDL  Skin: WDL  Lines/ Drains: PIV SL  Activity: IND  Nutrition: Regular   Pain: Denies     Plans: possible discharge today with halter monitor

## 2022-11-16 NOTE — PLAN OF CARE
Temp: 97.8  F (36.6  C) Temp src: Oral BP: (!) 170/74 Pulse: 60   Resp: 18 SpO2: 99 % O2 Device: None (Room air)       Orientation:  x4, very pleasant   VS: hypertension, otherwise stable   Pain:  denies pain   Tele:  SB/SR  Activity:  independent in the room   Resp:   LS clear, no coughing   GI:  no BM, no discomfort, ate her meals   : voids spontaneously   Lines: PIV SL and patent   Other:  Stroke education done. Pt is will get a Holter monitor prior discharge.   Plan:  Holter monitor placed, discharge papers reviewed and explained, medication given and explained, all questions answered. Letter to be able to drive electronically signed by MD given and reviewed with pt.   Family helps packing up all personal belongings, family is providing personal ride home

## 2022-11-16 NOTE — CONSULTS
11/16/22 1117 Radha Fermin RN     Stroke Education Note     The following information has been reviewed with the patient and family:video     1. Warning signs of stroke     2. Calling 911 if having warning signs of stroke     3. All modifiable risk factors: hypertension, CAD, atrial fib, diabetes, hypercholesterolemia, smoking, substance abuse, diet, physical inactivity, obesity, sleep apnea.     4. Patient's risk factors for stroke which include: HTN, HLD,      5. Follow-up plan for after discharge     6. Discharge medications which include: HTN, STATIN, Anti-platelets      In addition, the above information was given to the patient and family in writing as a part of the [facility] Stroke Class Handout.     Learner's response to risk factors / lifestyle modification education: Ability to change Committment to change Activation Taking steps      Radha Fermin RN,

## 2022-11-17 LAB
ATRIAL RATE - MUSE: 63 BPM
DIASTOLIC BLOOD PRESSURE - MUSE: NORMAL MMHG
INTERPRETATION ECG - MUSE: NORMAL
P AXIS - MUSE: 57 DEGREES
PR INTERVAL - MUSE: 160 MS
QRS DURATION - MUSE: 92 MS
QT - MUSE: 426 MS
QTC - MUSE: 435 MS
R AXIS - MUSE: 3 DEGREES
SYSTOLIC BLOOD PRESSURE - MUSE: NORMAL MMHG
T AXIS - MUSE: 29 DEGREES
VENTRICULAR RATE- MUSE: 63 BPM

## 2022-11-17 NOTE — CONSULTS
"      [facility]    Stroke Telephone Note    I was called by Ede Valdez on 11/16/22 regarding patient Candy Garsia. 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 She presents today with left face tingling, left cheek and maxilla and lips and it is pulsating. Onset around 6pm. She was discharged today.      Imaging Findings   Pending     Impression  HX of R inferior frontal gyrus infarct- she is currently on DAPT and Lipitor 40mg. She presents with left face tingling without any weakness or other deficits. Given positive symptoms unlikely to be due to an acute stroke although paresthesias can be seen in thalamic strokes. She has hypertension which can also cause paresthesia symptoms but can also be a response to an acute occlusion. If no new infarct then likely HTN is contributing to symptoms .      Recommendations       MRI brain WO  CTA head   SBP <180  Continue DAPT and Lipitor 40mg      My recommendations are based on the information provided over the phone by Candy Garsia's in-person providers. They are not intended to replace the clinical judgment of her in-person providers. I was not requested to personally see or examine the patient at this time.    The Stroke Staff is Dr. Skinner.    Amanda Mendosa MD  Vascular Neurology Fellow  To page me or covering stroke neurology team member, click here: AMCOM   Choose \"On Call\" tab at top, then search dropdown box for \"Neurology Adult\", select location, press Enter, then look for stroke/neuro ICU/telestroke.        "

## 2022-11-17 NOTE — ED TRIAGE NOTES
Here for tingling to left side of face around 6pm. Was seen here on 11/14 for stroke, admitted and discharge with prescription atorvastatin, lisinopril aspirin, and plavix. No slurred speech, no facial droop, no unilateral weakness, and no other neuro deficit noted. ABCS intact.      Triage Assessment     Row Name 11/16/22 [id number]       Triage Assessment (Adult)    Airway WDL WDL       Respiratory WDL    Respiratory WDL WDL       Cardiac WDL    Cardiac WDL WDL

## 2022-11-17 NOTE — TELEPHONE ENCOUNTER
Lip and cheek is tingling  Symptoms started half an hour ago  Patient was discharged from hospital today on 11/16/22 for CVA  Triage guidelines recommend to see HCP (or pcp triage within 4 hours)   Caller verbalized and understands directives      Reason for Disposition    [1] Tingling (e.g., pins and needles) of the face, arm / hand, or leg / foot on one side of the body AND [2] present now (Exceptions: chronic/recurrent symptom lasting > 4 weeks or tingling from known cause, such as: bumped elbow, carpal tunnel syndrome, pinched nerve, frostbite)    Additional Information    Negative: [1] SEVERE weakness (i.e., unable to walk or barely able to walk, requires support) AND [2] new-onset or worsening    Negative: [1] Weakness (i.e., paralysis, loss of muscle strength) of the face, arm / hand, or leg / foot on one side of the body AND [2] sudden onset AND [3] present now (Exception: Bell's palsy suspected [i.e., weakness only on one side of the face, developing over hours to days, no other symptoms])    Negative: [1] Numbness (i.e., loss of sensation) of the face, arm / hand, or leg / foot on one side of the body AND [2] sudden onset AND [3] present now    Negative: [1] Loss of speech or garbled speech AND [2] sudden onset AND [3] present now    Negative: Difficult to awaken or acting confused (e.g., disoriented, slurred speech)    Negative: Sounds like a life-threatening emergency to the triager    Negative: Confusion, disorientation, or hallucinations is main symptom    Negative: Neck pain is main symptom (and having weakness, numbness, or tingling in arm / hand because of neck pain)    Negative: Back pain is main symptom (and having weakness, numbness, or tingling in leg because of back pain)    Negative: Hand pain is main symptom (and having mild weakness, numbness, or tingling in hand related to hand pain)    Negative: Dizziness is main symptom    Negative: Vision loss or change is main symptom    Negative:  Followed a head injury within last 3 days    Negative: Followed a neck injury within last 3 days    Negative: [1] Tingling in both hands and/or feet AND [2] breathing faster than normal AND [3] feels similar to prior panic attack or hyperventilation episode    Negative: Weakness in both sides of the body or weakness all over    Negative: Headache  (and neurologic deficit)    Negative: [1] Back pain AND [2] numbness (loss of sensation) in groin or rectal area    Negative: [1] Unable to urinate (or only a few drops) > 4 hours AND [2] bladder feels very full (e.g., palpable bladder or strong urge to urinate)    Negative: [1] Loss of bladder or bowel control (urine or bowel incontinence; wetting self, leaking stool) AND [2] new-onset    Negative: [1] Weakness (i.e., paralysis, loss of muscle strength) of the face, arm / hand, or leg / foot on one side of the body AND [2] sudden onset AND [3] brief (now gone)    Negative: [1] Numbness (i.e., loss of sensation) of the face, arm / hand, or leg / foot on one side of the body AND [2] sudden onset AND [3] brief (now gone)    Negative: [1] Loss of speech or garbled speech AND [2] sudden onset AND [3] brief (now gone)    Negative: Bell's palsy suspected (i.e., weakness on only one side of the face, developing over hours to days, no other symptoms)    Negative: Patient sounds very sick or weak to the triager    Negative: Back pain (and neurologic deficit)    Negative: Neck pain (and neurologic deficit)    Negative: [1] Weakness of the face, arm / hand, or leg / foot on one side of the body AND [2] gradual onset (e.g., days to weeks) AND [3] present now    Negative: [1] Numbness (i.e., loss of sensation) of the face, arm / hand, or leg / foot on one side of the body AND [2] gradual onset (e.g., days to weeks) AND [3] present now    Negative: [1] Loss of speech or garbled speech AND [2] gradual onset (e.g., days to weeks) AND [3] present now    Protocols used: NEUROLOGIC  DEFICIT-A-AH

## 2022-11-17 NOTE — ED PROVIDER NOTES
History   Chief Complaint:  Tingling       HPI   Candy Garsia is a 82 year old female with history of hypertension and CVA who presents with facial tingling. Per chart review, the patient was admitted on 11/14 for an acute ischemic stroke of the right inferior frontal gyrus, and she was discharged this afternoon around 1430. She began feeling a tingling and pulsing sensation in the left side of her face around her mouth tonight around 1800. She notes that when she had her stroke, she presented with slurred speech and a left-sided facial droop. The sensation is beginning to dissipated slightly here, and she denies any weakness. Her blood pressure is 211 systolic.     Review of Systems   Neurological: Negative for weakness.        Facial tingling/pulsing   All other systems reviewed and are negative.      Allergies:  Compazine  Septra  Zithromax    Medications:  Plavix  Aspirin 325 mg  Atorvastatin  Cyanocobalamin  Lisinopril  Estradiol  Metoprolol  Rabeprazole  Cholecalciferol     Past Medical History:     Hypertension  CVA  Gastroesophageal reflux disease  Plantar fasciitis  Vertigo   Recurrent maxillary sinusitis  Anemia   Hypercholesterolemia      Past Surgical History:    Tonsillectomy  Hysterectomy     Family History:    Father- GI cancer  Mother- pulmonary fibrosis  Brother- hyperlipidemia, thyroid disease    Social History:  The patient presents to the ED with   PCP: Angel Zapata     Physical Exam     Patient Vitals for the past 24 hrs:   BP Temp Temp src Pulse Resp SpO2   11/16/22 2240 (!) 188/78 -- -- 62 -- 98 %   11/16/22 2230 (!) 186/69 -- -- 55 -- 96 %   11/16/22 2200 (!) 179/79 -- -- 58 -- 95 %   11/16/22 2145 (!) 179/69 -- -- -- -- 96 %   11/16/22 2030 (!) 168/68 -- -- 57 -- 96 %   11/16/22 2015 (!) 172/67 -- -- 60 -- 95 %   11/16/22 2000 (!) 170/68 -- -- 63 -- 96 %   11/16/22 1945 -- -- -- -- -- 99 %   11/16/22 1930 (!) 177/79 -- -- 61 -- 95 %   11/16/22 1904 (!) 211/81 98  F  (36.7  C) Oral 64 18 99 %       Physical Exam  Constitutional: Alert, attentive  HENT:    Nose: Nose normal.    Mouth/Throat: Oropharynx is clear, mucous membranes are moist  Eyes: EOM are normal. Pupils are equal, round, and reactive to light.   CV: Regular rate and rhythm, no murmurs, rubs or gallops.  Chest: Effort normal and breath sounds normal.   GI: No distension. There is no tenderness  MSK: Normal range of motion.   Neurological:   A/Ox3;   Cranial nerves 2-12 intact but notes paresthesias to the left face over the maxilla and upper lip  5/5 strength throughout the upper and lower extremities;   sensation intact to light touch throughout the upper and lower extremities;   normal gait   No meningismus   Skin: Skin is warm and dry.        Emergency Department Course   ECG:  ECG results from 11/16/22   EKG 12 lead     Value    Systolic Blood Pressure     Diastolic Blood Pressure     Ventricular Rate 63    Atrial Rate 63    GA Interval 160    QRS Duration 92        QTc 435    P Axis 57    R AXIS 3    T Axis 29    Interpretation ECG      Sinus rhythm  Normal ECG  When compared with ECG of 14-NOV-2022 11:44,  No significant change was found       Imaging:  CTA Head Neck with Contrast   Final Result   IMPRESSION:       HEAD CTA:    1.  No change from prior.   2.  Stable relatively severe stenosis of an anterior division right M3 segment with more proximal focal moderate stenosis of the feeding M2 segment.      NECK CTA:   1.  No change from prior.   2.  50-70% luminal stenosis left carotid bifurcation.   3.  Luminal irregularity of both internal carotid arteries and neck suggestive of fibromuscular dysplasia.      MR Brain w/o & w Contrast   Final Result   IMPRESSION:   1.  Small evolving infarct right inferior frontal lobe involving the pars opercularis. No hemorrhage.   2.  Mild chronic microvascular ischemic changes are stable.        Report per radiology    Laboratory:  Labs Ordered and Resulted from  Time of ED Arrival to Time of ED Departure   BASIC METABOLIC PANEL - Abnormal       Result Value    Sodium 139      Potassium 4.0      Chloride 103      Carbon Dioxide (CO2) 25      Anion Gap 11      Urea Nitrogen 22.0      Creatinine 0.93      Calcium 9.1      Glucose 114 (*)     GFR Estimate 61     TROPONIN T, HIGH SENSITIVITY - Abnormal    Troponin T, High Sensitivity 15 (*)    CBC WITH PLATELETS AND DIFFERENTIAL - Abnormal    WBC Count 9.2      RBC Count 3.77 (*)     Hemoglobin 11.8      Hematocrit 36.6      MCV 97      MCH 31.3      MCHC 32.2      RDW 12.8      Platelet Count 295      % Neutrophils 70      % Lymphocytes 18      % Monocytes 9      % Eosinophils 2      % Basophils 1      % Immature Granulocytes 0      NRBCs per 100 WBC 0      Absolute Neutrophils 6.5      Absolute Lymphocytes 1.7      Absolute Monocytes 0.8      Absolute Eosinophils 0.2      Absolute Basophils 0.1      Absolute Immature Granulocytes 0.0      Absolute NRBCs 0.0          Emergency Department Course:    Reviewed:  I reviewed nursing notes, vitals, past medical history and Care Everywhere    Assessments:  1922 I obtained history and examined the patient as noted above.   1945 I rechecked the patient.   2239 I rechecked the patient.     Consults:  1942 I spoke with stroke neurology regarding the patient's presentation.   2234 I spoke again with stroke neurology regarding the patient's imaging results.     Interventions:  2002: Ativan 1 mg PO    Disposition:  The patient was discharged to home.     Impression & Plan     CMS Diagnoses: None    Medical Decision Making:  This is an 82-year-old female with recent small right stroke manifesting as dysarthria who presents for evaluation of left facial paresthesias.  The patient has a normal neurologic exam at this time but notes paresthesias to the left cheek as noted above.  Given carotid stenosis and recent stroke, she underwent CTA head neck and MRI brain.  Fortunately the former shows no  change in stenosis MRI shows progression but no significant worsening or evolution of stroke.  Symptoms are resolved on recheck.  Of note, she has been proceeding via a permissive hypertension management program but has systolic blood pressures in the 170s average.  No signs or symptoms of hypertensive emergency.  I discussed the above with the patient and the stroke neurologist, who recommended possible observation stay for advancing hypertension medications.  Patient and  would like to be discharged home with increased lisinopril dose of this very reasonable.  Will increase to 20 mg daily and administer 1 dose of 10 mg here before discharge.  Plan primary care follow-up as scheduled and return precautions for stroke symptoms, weakness, or any other concerns.        Diagnosis:    ICD-10-CM    1. Facial paresthesia  R20.2       2. History of CVA (cerebrovascular accident)  Z86.73       3. Elevated blood pressure reading  R03.0             Scribe Disclosure:  I, Michelle Walker, am serving as a scribe at 7:22 PM on 11/16/2022 to document services personally performed by Ede Valdez MD based on my observations and the provider's statements to me.            Ede Valdez MD  11/16/22 [id number]

## 2022-11-17 NOTE — PROGRESS NOTES
Brief Clinical Note:    Patient's symptoms have resolved, blood pressure is still elevated at 180s. The MRI showed a small increase in her prior stroke but is unlikely to be causing any symptoms, in addition her CTA showed stable stenosis.     Plan:  admit for observation overnight  Q4h neuro checks  Strict BP control <160  Continue home meds    Amanda Mendosa  Vascular Stroke Fellow    Stroke Staff is Dr. Skinner

## 2022-11-18 ENCOUNTER — PATIENT OUTREACH (OUTPATIENT)
Dept: CARE COORDINATION | Facility: CLINIC | Age: 82
End: 2022-11-18

## 2022-11-18 NOTE — PROGRESS NOTES
"Clinic Care Coordination Contact  [facility]: Post-Discharge Note  SITUATION                                                      Admission:    Admission Date: 11/16/22   Reason for Admission: Facial paresthesia  Discharge:   Discharge Date: 11/16/22  Discharge Diagnosis: Facial paresthesia    BACKGROUND                                                      Per hospital discharge summary and inpatient provider notes:    Candy Garsia is a 82 year old female with history of hypertension and CVA who presents with facial tingling. Per chart review, the patient was admitted on 11/14 for an acute ischemic stroke of the right inferior frontal gyrus, and she was discharged this afternoon around 1430. She began feeling a tingling and pulsing sensation in the left side of her face around her mouth tonight around 1800. She notes that when she had her stroke, she presented with slurred speech and a left-sided facial droop. The sensation is beginning to dissipated slightly here, and she denies any weakness. Her blood pressure is 211 systolic.    ASSESSMENT      Discharge Assessment  How are you doing now that you are home?: \"Good\"  How are your symptoms? (Red Flag symptoms escalate to triage hotline per guidelines): Improved  Do you feel your condition is stable enough to be safe at home until your provider visit?: Yes  Does the patient have their discharge instructions? : Yes  Does the patient have questions regarding their discharge instructions? : Yes (see comment) (The patient would like to be added onto a waiting list for the [facility].)  Were you started on any new medications or were there changes to any of your previous medications? : Yes  Does the patient have all of their medications?: Yes  Do you have questions regarding any of your medications? : No  Do you have all of your needed medical supplies or equipment (DME)?  (i.e. oxygen tank, CPAP, cane, etc.): Yes  Discharge follow-up appointment " scheduled within 14 calendar days? : Yes  Discharge Follow Up Appointment Date: 11/23/22  Discharge Follow Up Appointment Scheduled with?: Primary Care Provider    Post-op ([facility] CTA Only)  If the patient had a surgery or procedure, do they have any questions for a nurse?: No    PLAN                                                      Outpatient Plan:    Follow up with Angel Zapata MD (Family Medicine); as scheduled    Future Appointments   Date Time Provider Department Center   1/16/2023 11:00 AM Gab[facility] Gill [facility]N C[facility]         For any urgent concerns, please contact our 24 hour nurse triage line: [phone] ([phone])         NANETTE Alvarado  [phone]  Connected Care Resource [facility]

## 2023-03-14 ENCOUNTER — HOSPITAL ENCOUNTER (EMERGENCY)
Facility: CLINIC | Age: 83
Discharge: HOME | End: 2023-03-15
Attending: EMERGENCY MEDICINE | Admitting: EMERGENCY MEDICINE
Payer: MEDICARE

## 2023-03-14 ENCOUNTER — OFFICE VISIT (OUTPATIENT)
Dept: NEUROLOGY | Facility: CLINIC | Age: 83
End: 2023-03-14
Attending: NURSE PRACTITIONER
Payer: MEDICARE

## 2023-03-14 VITALS
HEIGHT: 61 IN | TEMPERATURE: 96.3 F | HEART RATE: 64 BPM | RESPIRATION RATE: 14 BRPM | WEIGHT: 133 LBS | SYSTOLIC BLOOD PRESSURE: 189 MMHG | BODY MASS INDEX: 25.11 KG/M2 | OXYGEN SATURATION: 97 % | DIASTOLIC BLOOD PRESSURE: 83 MMHG

## 2023-03-14 VITALS
OXYGEN SATURATION: 100 % | DIASTOLIC BLOOD PRESSURE: 97 MMHG | WEIGHT: 135 LBS | HEIGHT: 61 IN | HEART RATE: 61 BPM | BODY MASS INDEX: 25.49 KG/M2 | SYSTOLIC BLOOD PRESSURE: 204 MMHG

## 2023-03-14 DIAGNOSIS — I63.9 CEREBROVASCULAR ACCIDENT (CVA), UNSPECIFIED MECHANISM (H): ICD-10-CM

## 2023-03-14 DIAGNOSIS — I10 HYPERTENSION, UNSPECIFIED TYPE: ICD-10-CM

## 2023-03-14 LAB
ANION GAP SERPL CALCULATED.3IONS-SCNC: 9 MMOL/L (ref 7–15)
ATRIAL RATE - MUSE: 59 BPM
BASOPHILS # BLD AUTO: 0.1 10E3/UL (ref 0–0.2)
BASOPHILS NFR BLD AUTO: 1 %
BUN SERPL-MCNC: 15.6 MG/DL (ref 8–23)
CALCIUM SERPL-MCNC: 9.5 MG/DL (ref 8.8–10.2)
CHLORIDE SERPL-SCNC: 104 MMOL/L (ref 98–107)
CREAT SERPL-MCNC: 0.86 MG/DL (ref 0.51–0.95)
DEPRECATED HCO3 PLAS-SCNC: 30 MMOL/L (ref 22–29)
DIASTOLIC BLOOD PRESSURE - MUSE: NORMAL MMHG
EOSINOPHIL # BLD AUTO: 0.3 10E3/UL (ref 0–0.7)
EOSINOPHIL NFR BLD AUTO: 4 %
ERYTHROCYTE [DISTWIDTH] IN BLOOD BY AUTOMATED COUNT: 13.4 % (ref 10–15)
GFR SERPL CREATININE-BSD FRML MDRD: 67 ML/MIN/1.73M2
GLUCOSE SERPL-MCNC: 89 MG/DL (ref 70–99)
HCT VFR BLD AUTO: 36.1 % (ref 35–47)
HGB BLD-MCNC: 11.5 G/DL (ref 11.7–15.7)
IMM GRANULOCYTES # BLD: 0 10E3/UL
IMM GRANULOCYTES NFR BLD: 0 %
INTERPRETATION ECG - MUSE: NORMAL
LYMPHOCYTES # BLD AUTO: 2.2 10E3/UL (ref 0.8–5.3)
LYMPHOCYTES NFR BLD AUTO: 29 %
MCH RBC QN AUTO: 30.6 PG (ref 26.5–33)
MCHC RBC AUTO-ENTMCNC: 31.9 G/DL (ref 31.5–36.5)
MCV RBC AUTO: 96 FL (ref 78–100)
MONOCYTES # BLD AUTO: 0.7 10E3/UL (ref 0–1.3)
MONOCYTES NFR BLD AUTO: 9 %
NEUTROPHILS # BLD AUTO: 4.4 10E3/UL (ref 1.6–8.3)
NEUTROPHILS NFR BLD AUTO: 57 %
NRBC # BLD AUTO: 0 10E3/UL
NRBC BLD AUTO-RTO: 0 /100
P AXIS - MUSE: 60 DEGREES
PLATELET # BLD AUTO: 285 10E3/UL (ref 150–450)
POTASSIUM SERPL-SCNC: 3.5 MMOL/L (ref 3.4–5.3)
PR INTERVAL - MUSE: 154 MS
QRS DURATION - MUSE: 94 MS
QT - MUSE: 420 MS
QTC - MUSE: 415 MS
R AXIS - MUSE: 3 DEGREES
RBC # BLD AUTO: 3.76 10E6/UL (ref 3.8–5.2)
SODIUM SERPL-SCNC: 143 MMOL/L (ref 136–145)
SYSTOLIC BLOOD PRESSURE - MUSE: NORMAL MMHG
T AXIS - MUSE: 21 DEGREES
TROPONIN T SERPL HS-MCNC: 8 NG/L
VENTRICULAR RATE- MUSE: 59 BPM
WBC # BLD AUTO: 7.6 10E3/UL (ref 4–11)

## 2023-03-14 PROCEDURE — 85025 COMPLETE CBC W/AUTO DIFF WBC: CPT | Performed by: EMERGENCY MEDICINE

## 2023-03-14 PROCEDURE — 99284 EMERGENCY DEPT VISIT MOD MDM: CPT

## 2023-03-14 PROCEDURE — 84484 ASSAY OF TROPONIN QUANT: CPT | Performed by: EMERGENCY MEDICINE

## 2023-03-14 PROCEDURE — 80048 BASIC METABOLIC PNL TOTAL CA: CPT | Performed by: EMERGENCY MEDICINE

## 2023-03-14 PROCEDURE — 36415 COLL VENOUS BLD VENIPUNCTURE: CPT | Performed by: EMERGENCY MEDICINE

## 2023-03-14 PROCEDURE — 99214 OFFICE O/P EST MOD 30 MIN: CPT | Mod: GC

## 2023-03-14 PROCEDURE — 93005 ELECTROCARDIOGRAM TRACING: CPT

## 2023-03-14 RX ORDER — LISINOPRIL 5 MG/1
10 TABLET ORAL DAILY
Qty: 15 TABLET | Refills: 0 | Status: SHIPPED | OUTPATIENT
Start: 2023-03-14

## 2023-03-14 RX ORDER — LISINOPRIL 10 MG/1
10 TABLET ORAL ONCE
Status: COMPLETED | OUTPATIENT
Start: 2023-03-14 | End: 2023-03-15

## 2023-03-14 ASSESSMENT — ENCOUNTER SYMPTOMS
NUMBNESS: 0
HEADACHES: 0
SPEECH DIFFICULTY: 0
WEAKNESS: 0

## 2023-03-14 ASSESSMENT — ACTIVITIES OF DAILY LIVING (ADL): ADLS_ACUITY_SCORE: 35

## 2023-03-14 NOTE — LETTER
3/14/2023         RE: Candy Garsia  [address] [facility] [id number]        Dear Colleague,    Thank you for referring your patient, Candy Garsia, to the [facility]. Please see a copy of my visit note below.    [facility]/[place]  Section of General Neurology  New Patient Visit      Candy Garsia MRN# [id number]   Age: 82 year old YOB: 1940     Requesting physician: Angel Briones     Reason for Consultation: Stroke follow up           Assessment and Plan:   Assessment:  Candy is an 82-year-old female patient with past medical history of hyperlipidemia and hypertension, who recently presented to [place] emergency department as a code stroke.  Patient was found to have a small infarct in the right inferior frontal gyrus as well as some stenotic intracranial and cervical vasculature.      We reviewed performed at the hospital stroke work-up.  7-day heart monitor was negative for A-fib, though 7 days is technically below the traditional time recommendation.  Could consider another 7 to 14 days of cardiac monitoring to obtain sufficient amount of data to rule out A-fib as a possible stroke etiology.  LDL was 90, patient taking atorvastatin 40 mg.  A1c normal at 5.4%.    Most likely etiology of patient's stroke is intracranial atherosclerosis with potential contribution from small vessel ischemic disease in context of hyperlipidemia and poorly controlled hypertension.  Current goal for this patient is stroke secondary prevention.  Pertinent risk factors for her include high blood pressure (systolics in the 200s today at this visit patient - was asymptomatic; however, persistent hypertension puts patient at high risk for new stroke event).      Recommend at this time continuing aspirin 325 mg daily.  Okay to stop Plavix since it has been over 90 days since discharge.  Recommend following up with primary care for  blood pressure evaluation.  Currently have high suspicion for fibromuscular dysplasia resulting in possible renal artery stenosis.  This theory is based on the tortuosity of her vessels and stenosis seen within her carotid arteries and right M3, M2 on her recent CTA head.  Also recommended to patient that she go to urgent care for evaluation since her blood pressures were very high at today's visit.     Plan:  -Due to persistently high systolic blood pressures over 200 today, recommend going to urgent care immediately for evaluation.  -Aspirin 325 mg daily indefinitely  -Stop Plavix/clopidogrel since she has completed recommended 90-day DAPT course  -Long-term blood pressure goal is less than 130/85 -  blood pressure seems to be running high.  This needs to be re-evaluated and medications may need to be changed.   -Long-term LDL goal is 40-70 - continue atorvastatin 40 mg daily with repeat fasting lipid panel under the guidance of primary care provider to ensure that LDL is within recommended goals after switching from simvastatin  -Long-term goal of hemoglobin A1C is less than 7.0 - current is 5.4  -Low-salt low-fat diet  -Regular aerobic exercise 30 minutes 3-4 times per week    Follow-up with neurology on as-needed basis.      RAFAEL Barrera D.O.  Resident Physician of Neurology  [facility]    Patient discussed with my supervising physician Dr. Mayes, who agrees with the critical findings, assessment, and plan as documented in the note above or as otherwise in their attestation.        History of Presenting Symptoms:   Candy Garsia is a 82 year old female who presents today for evaluation of recent stroke that occurred in November 2022.  Patient states that she was attending a line dancing activity with some of her close friends.  While she was attempting to teach one of her friends a specific step pattern, she found herself experiencing speech difficulties.  She states that she  particularly had trouble saying words that included the R.  One of her friends pointed out that she had a mild left facial droop.  EMS was called and she was rushed to the [place] emergency department.  Code stroke was called, CT head, CTA as well as brain MRI were obtained.  CT head was negative, while CTA revealed mild internal carotid artery stenosis on the right, moderate stenosis on the left as well as some vascular tortuosity diffusely, a right M3 occlusion with distal M2 stenosis.  MRI revealed small acute infarct of the right inferior frontal gyrus.    Patient states that her stroke symptoms resolved within an hour while in the emergency department.  No other deficits were noted, and patient currently does not have any deficits to report today.  Her only complaint currently is that she has frequent bruising in the setting of DAPT therapy.  Denies dizziness, lightheadedness, headache, or vision changes.    Patient states that she has been taking however her atorvastatin 40 mg, blood pressure medications as well as her DAPT therapy consistently.  She reports of only missing 1 day of morning medications since her stroke.    Patient was discharged with a Zio patch for 7 days which revealed no A-fib.  Echo was negative for any abnormalities that would explain her stroke.        Past Medical History:     Patient Active Problem List   Diagnosis     B12 deficiency anemia     Abdominal wall lump     GERD (gastroesophageal reflux disease)     Acute recurrent maxillary sinusitis     Knee pain     Menopausal flushing     Vertigo     Plantar fasciitis     Benign essential hypertension     Cerebrovascular accident (CVA), unspecified mechanism (H)     Past Medical History:   Diagnosis Date     Dyslipidemia         Past Surgical History:   No past surgical history on file.     Social History:     Social History     Tobacco Use     Smoking status: Never   Substance Use Topics     Alcohol use: Yes     Drug use: No         "Family History:   No family history on file.     Medications:     Current Outpatient Medications   Medication Sig     aspirin (ASA) 325 MG EC tablet Take 1 tablet (325 mg) by mouth daily     atorvastatin (LIPITOR) 40 MG tablet Take 1 tablet (40 mg) by mouth every evening     clopidogrel (PLAVIX) 75 MG tablet 1 tablet (75 mg) by Oral or NG Tube route daily     cyanocobalamin 1000 MCG/ML injection Inject 1 mL as directed every 30 days.     ESTRADIOL PO Take 0.5 mg by mouth twice a week Wed/Sat     lisinopril (ZESTRIL) 10 MG tablet Take 1 tablet (10 mg) by mouth daily     metoprolol tartrate (LOPRESSOR) 25 MG tablet Take 1 tablet (25 mg) by mouth 2 times daily     rabeprazole (ACIPHEX) 20 MG tablet Take 1 tablet by mouth daily.     triamcinolone (KENALOG) 0.1 % external cream Apply topically as needed for irritation     vitamin D3 (CHOLECALCIFEROL) 50 mcg (2000 units) tablet Take 1 tablet by mouth daily     No current facility-administered medications for this visit.        Allergies:     Allergies   Allergen Reactions     Compazine [Prochlorperazine]      Septra [Sulfamethoxazole W/Trimethoprim]      Zithromax [Azithromycin]         Review of Systems:   As noted above     Physical Exam:   Vitals: BP (!) 204/97   Pulse 61   Ht 1.549 m (5' 1\")   Wt 61.2 kg (135 lb)   SpO2 100%   BMI 25.51 kg/m    CV: peripheral pulse appreciated  Lungs: breathing comfortably  Extremities: no edema  Skin: A few bruises noted on left arm    Neuro:   General Appearance: No apparent distress, well-nourished, well-groomed, pleasant     Mental Status: Alert and oriented to person, place, and time. Speech fluent and comprehension intact. No dysarthria. Normal memory, fund of knowledge, attention/concentration, and language    Cranial Nerves:   II: Visual fields: normal  III: Pupils: 3 mm, equal, round, reactive to light   III,IV,VI: Extraocular Movements: intact   V: Facial sensation: intact to light touch  VII: Facial strength: intact " without asymmetry  VIII: Hearing: intact grossly  IX: Palate: intact   XI: Shoulder shrug: intact  XII: Tongue movement: normal     Motor Exam:   5/5 Diffusely    No drift is present. No abnormal movements. Tone is normal throughout.    Sensory: intact to light touch, vibration, and pinprick throughout    Coordination: no dysmetria with finger-to-nose bilaterally    Reflexes: biceps, triceps, brachioradialis, patellar, and ankle jerks 2+ and symmetric. Toes are downgoing bilaterally    Gait: normal casual gait, normal stride length         Data: Pertinent prior to visit   Imaging:  CT head 11/14/22  IMPRESSION:  1. No acute intracranial hemorrhage, extra-axial fluid collection, or  mass effect.  2. Mild scattered patchy hypoattenuation in the cerebral white matter,  as described. This may represent sequela of chronic ischemic change.  No large confluent territorial infarction identified on CT.  3. Mild generalized brain parenchymal volume loss.    CTA head and neck:  1. Segmental severe stenosis versus subtotal occlusion of an M2 branch  of the right middle cerebral artery, as described.  2. Otherwise, no high-grade stenosis or large vessel occlusion of the  major intracranial arteries.  3. 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  4. Approximately 50-70% stenosis of the proximal left internal carotid  artery due to atherosclerosis.    US carotid b/l  1. No significant right internal carotid artery stenosis identified on  duplex imaging. Elevated velocities distal right internal carotid  artery likely related to tortuosity. No significant carotid stenosis  identified (than 50%)  2. Moderate elevation systolic velocities left internal carotid artery  consistent with a moderate 50-69% stenosis.  Evaluation based on velocities and NASCET criteria.     MRI brain 11/16/22  1.  Small evolving infarct right inferior frontal lobe involving the pars opercularis. No  hemorrhage.  2.  Mild chronic microvascular ischemic changes are stable.      Procedures:  ECHO 1/14/22:  1. The left ventricle is normal in size. There is normal left ventricular wall  thickness. Left ventricular systolic function is normal. The visual ejection  fraction is 60-65%. Diastolic Doppler findings (E/E' ratio and/or other  parameters) suggest left ventricular filling pressures are indeterminate. No  regional wall motion abnormalities noted.  2. The right ventricle is normal size. The right ventricular systolic function  is normal.  3. Trace to mild mitral and tricuspid regurgitation.  4. No pericardial effusion.  5. No previous study for comparison.    EKG 11/16/22 :  Sinus rhythm   Normal ECG     Laboratory:  LDL: 90  A1c: 5.4          Attestation with edits by Forest Mayes MD at 3/14/2023  8:19 PM:  Attending physician attestation.   I have personally reviewed and wearing necessary edited documentation.  I confirmed pertinent history and physical examination as documented. I agree with assessment and plan.  Forest Mayes MD.        Again, thank you for allowing me to participate in the care of your patient.        Sincerely,        Bon Barrera MD

## 2023-03-14 NOTE — PATIENT INSTRUCTIONS
AFTER VISIT SUMMARY (AVS):    At today's visit we thoroughly discussed current symptoms, results of stroke evaluation, symptoms and signs of stroke, secondary stroke prevention measures, and the plan.    Symptoms and signs of stroke were discussed.  You should call 911 with any SUDDEN onset of weakness, vision loss, speech problems, numbness, or severe headache of unknown cause.    For secondary stroke prevention:  -Due to persistently high systolic blood pressures over 200 today, recommend going to urgent care for evaluation.  -Aspirin 325 mg daily  -Ok to stop Plavix/clopidogrel  -Long-term blood pressure goal is less than 130/85 - your blood pressure seems to be running high.  This needs to be re-evaluated and your medications may need to be changed.    -Long-term LDL goal is 40-70 - continue atorvastatin 40 mg  -Long-term goal of hemoglobin A1C is less than 7.0  -Low-salt low-fat diet  -Regular aerobic exercise 30 minutes 3-4 times per week    Consider getting another 1-2 weeks of cardiac monitoring to firmly rule out a-fib as a possible reason for your stroke.      Consider renal artery stenosis as a possible cause of your high blood pressure.  You  may need a picture or your kidney arteries to confirm this diagnosis.      Please do not hesitate to call me with any questions or concerns.    Thanks.

## 2023-03-14 NOTE — ED TRIAGE NOTES
Pt was sent from clinic with hypertension with systolics in the 200s.      Triage Assessment     Row Name 03/14/23 2246       Triage Assessment (Adult)    Airway WDL WDL       Respiratory WDL    Respiratory WDL WDL       Skin Circulation/Temperature WDL    Skin Circulation/Temperature WDL WDL       Cardiac WDL    Cardiac WDL X  BPelevated       Peripheral/Neurovascular WDL    Peripheral Neurovascular WDL WDL       Cognitive/Neuro/Behavioral WDL    Cognitive/Neuro/Behavioral WDL WDL

## 2023-03-14 NOTE — PROGRESS NOTES
[facility]/[place]  Section of General Neurology  New Patient Visit      Candy Garsia MRN# [id number]   Age: 82 year old YOB: 1940     Requesting physician: Angel Briones     Reason for Consultation: Stroke follow up           Assessment and Plan:   Assessment:  Candy is an 82-year-old female patient with past medical history of hyperlipidemia and hypertension, who recently presented to [place] emergency department as a code stroke.  Patient was found to have a small infarct in the right inferior frontal gyrus as well as some stenotic intracranial and cervical vasculature.      We reviewed performed at the hospital stroke work-up.  7-day heart monitor was negative for A-fib, though 7 days is technically below the traditional time recommendation.  Could consider another 7 to 14 days of cardiac monitoring to obtain sufficient amount of data to rule out A-fib as a possible stroke etiology.  LDL was 90, patient taking atorvastatin 40 mg.  A1c normal at 5.4%.    Most likely etiology of patient's stroke is intracranial atherosclerosis with potential contribution from small vessel ischemic disease in context of hyperlipidemia and poorly controlled hypertension.  Current goal for this patient is stroke secondary prevention.  Pertinent risk factors for her include high blood pressure (systolics in the 200s today at this visit patient - was asymptomatic; however, persistent hypertension puts patient at high risk for new stroke event).      Recommend at this time continuing aspirin 325 mg daily.  Okay to stop Plavix since it has been over 90 days since discharge.  Recommend following up with primary care for blood pressure evaluation.  Currently have high suspicion for fibromuscular dysplasia resulting in possible renal artery stenosis.  This theory is based on the tortuosity of her vessels and stenosis seen within her carotid arteries and right M3, M2 on her recent  CTA head.  Also recommended to patient that she go to urgent care for evaluation since her blood pressures were very high at today's visit.     Plan:  -Due to persistently high systolic blood pressures over 200 today, recommend going to urgent care immediately for evaluation.  -Aspirin 325 mg daily indefinitely  -Stop Plavix/clopidogrel since she has completed recommended 90-day DAPT course  -Long-term blood pressure goal is less than 130/85 -  blood pressure seems to be running high.  This needs to be re-evaluated and medications may need to be changed.   -Long-term LDL goal is 40-70 - continue atorvastatin 40 mg daily with repeat fasting lipid panel under the guidance of primary care provider to ensure that LDL is within recommended goals after switching from simvastatin  -Long-term goal of hemoglobin A1C is less than 7.0 - current is 5.4  -Low-salt low-fat diet  -Regular aerobic exercise 30 minutes 3-4 times per week    Follow-up with neurology on as-needed basis.      RAFALE Barrera D.O.  Resident Physician of Neurology  [facility]    Patient discussed with my supervising physician Dr. Mayes, who agrees with the critical findings, assessment, and plan as documented in the note above or as otherwise in their attestation.        History of Presenting Symptoms:   Candy Garsia is a 82 year old female who presents today for evaluation of recent stroke that occurred in November 2022.  Patient states that she was attending a line dancing activity with some of her close friends.  While she was attempting to teach one of her friends a specific step pattern, she found herself experiencing speech difficulties.  She states that she particularly had trouble saying words that included the R.  One of her friends pointed out that she had a mild left facial droop.  EMS was called and she was rushed to the [place] emergency department.  Code stroke was called, CT head, CTA as well as brain MRI  were obtained.  CT head was negative, while CTA revealed mild internal carotid artery stenosis on the right, moderate stenosis on the left as well as some vascular tortuosity diffusely, a right M3 occlusion with distal M2 stenosis.  MRI revealed small acute infarct of the right inferior frontal gyrus.    Patient states that her stroke symptoms resolved within an hour while in the emergency department.  No other deficits were noted, and patient currently does not have any deficits to report today.  Her only complaint currently is that she has frequent bruising in the setting of DAPT therapy.  Denies dizziness, lightheadedness, headache, or vision changes.    Patient states that she has been taking however her atorvastatin 40 mg, blood pressure medications as well as her DAPT therapy consistently.  She reports of only missing 1 day of morning medications since her stroke.    Patient was discharged with a Zio patch for 7 days which revealed no A-fib.  Echo was negative for any abnormalities that would explain her stroke.        Past Medical History:     Patient Active Problem List   Diagnosis     B12 deficiency anemia     Abdominal wall lump     GERD (gastroesophageal reflux disease)     Acute recurrent maxillary sinusitis     Knee pain     Menopausal flushing     Vertigo     Plantar fasciitis     Benign essential hypertension     Cerebrovascular accident (CVA), unspecified mechanism (H)     Past Medical History:   Diagnosis Date     Dyslipidemia         Past Surgical History:   No past surgical history on file.     Social History:     Social History     Tobacco Use     Smoking status: Never   Substance Use Topics     Alcohol use: Yes     Drug use: No        Family History:   No family history on file.     Medications:     Current Outpatient Medications   Medication Sig     aspirin (ASA) 325 MG EC tablet Take 1 tablet (325 mg) by mouth daily     atorvastatin (LIPITOR) 40 MG tablet Take 1 tablet (40 mg) by mouth every  "evening     clopidogrel (PLAVIX) 75 MG tablet 1 tablet (75 mg) by Oral or NG Tube route daily     cyanocobalamin 1000 MCG/ML injection Inject 1 mL as directed every 30 days.     ESTRADIOL PO Take 0.5 mg by mouth twice a week Wed/Sat     lisinopril (ZESTRIL) 10 MG tablet Take 1 tablet (10 mg) by mouth daily     metoprolol tartrate (LOPRESSOR) 25 MG tablet Take 1 tablet (25 mg) by mouth 2 times daily     rabeprazole (ACIPHEX) 20 MG tablet Take 1 tablet by mouth daily.     triamcinolone (KENALOG) 0.1 % external cream Apply topically as needed for irritation     vitamin D3 (CHOLECALCIFEROL) 50 mcg (2000 units) tablet Take 1 tablet by mouth daily     No current facility-administered medications for this visit.        Allergies:     Allergies   Allergen Reactions     Compazine [Prochlorperazine]      Septra [Sulfamethoxazole W/Trimethoprim]      Zithromax [Azithromycin]         Review of Systems:   As noted above     Physical Exam:   Vitals: BP (!) 204/97   Pulse 61   Ht 1.549 m (5' 1\")   Wt 61.2 kg (135 lb)   SpO2 100%   BMI 25.51 kg/m    CV: peripheral pulse appreciated  Lungs: breathing comfortably  Extremities: no edema  Skin: A few bruises noted on left arm    Neuro:   General Appearance: No apparent distress, well-nourished, well-groomed, pleasant     Mental Status: Alert and oriented to person, place, and time. Speech fluent and comprehension intact. No dysarthria. Normal memory, fund of knowledge, attention/concentration, and language    Cranial Nerves:   II: Visual fields: normal  III: Pupils: 3 mm, equal, round, reactive to light   III,IV,VI: Extraocular Movements: intact   V: Facial sensation: intact to light touch  VII: Facial strength: intact without asymmetry  VIII: Hearing: intact grossly  IX: Palate: intact   XI: Shoulder shrug: intact  XII: Tongue movement: normal     Motor Exam:   5/5 Diffusely    No drift is present. No abnormal movements. Tone is normal throughout.    Sensory: intact to light " touch, vibration, and pinprick throughout    Coordination: no dysmetria with finger-to-nose bilaterally    Reflexes: biceps, triceps, brachioradialis, patellar, and ankle jerks 2+ and symmetric. Toes are downgoing bilaterally    Gait: normal casual gait, normal stride length         Data: Pertinent prior to visit   Imaging:  CT head 11/14/22  IMPRESSION:  1. No acute intracranial hemorrhage, extra-axial fluid collection, or  mass effect.  2. Mild scattered patchy hypoattenuation in the cerebral white matter,  as described. This may represent sequela of chronic ischemic change.  No large confluent territorial infarction identified on CT.  3. Mild generalized brain parenchymal volume loss.    CTA head and neck:  1. Segmental severe stenosis versus subtotal occlusion of an M2 branch  of the right middle cerebral artery, as described.  2. Otherwise, no high-grade stenosis or large vessel occlusion of the  major intracranial arteries.  3. 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  4. Approximately 50-70% stenosis of the proximal left internal carotid  artery due to atherosclerosis.    US carotid b/l  1. No significant right internal carotid artery stenosis identified on  duplex imaging. Elevated velocities distal right internal carotid  artery likely related to tortuosity. No significant carotid stenosis  identified (than 50%)  2. Moderate elevation systolic velocities left internal carotid artery  consistent with a moderate 50-69% stenosis.  Evaluation based on velocities and NASCET criteria.     MRI brain 11/16/22  1.  Small evolving infarct right inferior frontal lobe involving the pars opercularis. No hemorrhage.  2.  Mild chronic microvascular ischemic changes are stable.      Procedures:  ECHO 1/14/22:  1. The left ventricle is normal in size. There is normal left ventricular wall  thickness. Left ventricular systolic function is normal. The visual  ejection  fraction is 60-65%. Diastolic Doppler findings (E/E' ratio and/or other  parameters) suggest left ventricular filling pressures are indeterminate. No  regional wall motion abnormalities noted.  2. The right ventricle is normal size. The right ventricular systolic function  is normal.  3. Trace to mild mitral and tricuspid regurgitation.  4. No pericardial effusion.  5. No previous study for comparison.    EKG 11/16/22 :  Sinus rhythm   Normal ECG     Laboratory:  LDL: 90  A1c: 5.4

## 2023-03-14 NOTE — NURSING NOTE
"Candy Garsia is a 82 year old female who presents for:  Chief Complaint   Patient presents with     Stroke     Cerebrovascular Accident         Initial Vitals:  BP (!) 204/97   Pulse 61   Ht 1.549 m (5' 1\")   Wt 61.2 kg (135 lb)   SpO2 100%   BMI 25.51 kg/m   Estimated body mass index is 25.51 kg/m  as calculated from the following:    Height as of this encounter: 1.549 m (5' 1\").    Weight as of this encounter: 61.2 kg (135 lb).. Body surface area is 1.62 meters squared. BP completed using cuff size: small doris Chapman  "

## 2023-03-15 ENCOUNTER — TELEPHONE (OUTPATIENT)
Dept: NEUROLOGY | Facility: CLINIC | Age: 83
End: 2023-03-15
Payer: MEDICARE

## 2023-03-15 PROCEDURE — 250N000013 HC RX MED GY IP 250 OP 250 PS 637: Performed by: EMERGENCY MEDICINE

## 2023-03-15 RX ADMIN — LISINOPRIL 10 MG: 10 TABLET ORAL at 00:10

## 2023-03-15 NOTE — ED PROVIDER NOTES
History     Chief Complaint:  Hypertension       HPI   Candy Garsia is a 82 year old female who presents with elevated blood pressure.  She reports she had a stroke consisting of some change in speech in November.  Her symptoms had completely resolved.  She followed up with neurology today and it was noted that her blood pressure was elevated.  Patient reports that she was directed to the urgent care who then directed her to the emergency department without seeing her.  Patient denies any headaches, numbness or weakness, change in speech, chest pain, or any other complaints.  She states that she has been taking her blood pressure medication regularly.  She states that she just seen her doctor last week her blood pressure was in the 130s systolic.  She states that she very frequently has elevated blood pressure when she is evaluated in medical facilities.      Independent Historian:   None - Patient Only    Review of External Notes: Neurology note 3/14/23 discussing elevated BP and direction to     ROS:  Review of Systems   Cardiovascular: Negative for chest pain.   Neurological: Negative for speech difficulty, weakness, numbness and headaches.   All other systems reviewed and are negative.      Allergies:  Compazine [Prochlorperazine]  Septra [Sulfamethoxazole W/Trimethoprim]  Zithromax [Azithromycin]   Latex    Medications:    Aspirin 325mg  Lipitor  Plavix  Cyanocobalamin  Estradiol  Zestril  Lopressor  Aciphex  Cholecalciferol    Past Medical History:    Dyslipidemia  B12 deficiency anemia  Abdominal wall lump  GERD  Acute recurrent maxillary sinusitis  Vertigo  Menopausal flushing  Planta fasciitis  Hypertension  CVA  Hypercholesterolemia   Skin cancer  Benign tumor of uterus    Past Surgical History:    Tonsillectomy  Hysterectomy  Skin biopsy, leg  Colonoscopy     Family History:    Father: Stomach cancer  Mother: Pulmonary fibrosis  Brother: Thyroid disease, Hyperlipidemia     Social History:  PCP:  "Angel Zapata     Physical Exam     Patient Vitals for the past 24 hrs:   BP Temp Temp src Pulse Resp SpO2 Height Weight   03/14/23 2345 (!) 189/83 -- -- 64 -- -- -- --   03/14/23 2330 (!) 183/83 -- -- 59 -- -- -- --   03/14/23 2315 (!) 176/82 -- -- 59 -- -- -- --   03/14/23 2311 (!) 178/76 -- -- 61 -- -- -- --   03/14/23 2234 (!) 197/91 -- -- -- -- -- -- --   03/14/23 2224 (!) 194/92 -- -- -- -- 97 % -- --   03/14/23 1533 (!) 215/87 (!) 96.3  F (35.7  C) Oral 64 14 100 % 1.549 m (5' 1\") 60.3 kg (133 lb)        Physical Exam  General: Appears well-developed and well-nourished.   Head: No signs of trauma.   Mouth/Throat: Oropharynx is clear and moist.   Eyes: Conjunctivae are normal. Pupils are equal, round, and reactive to light.   Neck: Normal range of motion.   CV: Normal rate and regular rhythm.    Resp: Effort normal and breath sounds normal. No respiratory distress.   GI: Soft. There is no tenderness.  No rebound or guarding.  Normal bowel sounds.    MSK: Normal range of motion.   Neuro: The patient is alert and oriented to person, place, and time.  PERRLA, EOMI, strength in upper/lower extremities normal and symmetrical. Sensation normal. Speech normal.  Skin: Skin is warm and dry. No rash noted.   Psych: normal mood and affect. behavior is normal.       Emergency Department Course     ECG results from 03/14/23   EKG 12-lead, tracing only     Value    Systolic Blood Pressure     Diastolic Blood Pressure     Ventricular Rate 59    Atrial Rate 59    RI Interval 154    QRS Duration 94        QTc 415    P Axis 60    R AXIS 3    T Axis 21    Interpretation ECG      Sinus bradycardia  Otherwise normal ECG  When compared with ECG of 16-NOV-2022 19:56,  No significant change was found  Confirmed by GENERATED REPORT, COMPUTER ([id number]),  Xochilt Shafer ([id number]) on 3/14/2023 7:05:47 PM         Laboratory:  Labs Ordered and Resulted from Time of ED Arrival to Time of ED Departure   BASIC METABOLIC " PANEL - Abnormal       Result Value    Sodium 143      Potassium 3.5      Chloride 104      Carbon Dioxide (CO2) 30 (*)     Anion Gap 9      Urea Nitrogen 15.6      Creatinine 0.86      Calcium 9.5      Glucose 89      GFR Estimate 67     CBC WITH PLATELETS AND DIFFERENTIAL - Abnormal    WBC Count 7.6      RBC Count 3.76 (*)     Hemoglobin 11.5 (*)     Hematocrit 36.1      MCV 96      MCH 30.6      MCHC 31.9      RDW 13.4      Platelet Count 285      % Neutrophils 57      % Lymphocytes 29      % Monocytes 9      % Eosinophils 4      % Basophils 1      % Immature Granulocytes 0      NRBCs per 100 WBC 0      Absolute Neutrophils 4.4      Absolute Lymphocytes 2.2      Absolute Monocytes 0.7      Absolute Eosinophils 0.3      Absolute Basophils 0.1      Absolute Immature Granulocytes 0.0      Absolute NRBCs 0.0     TROPONIN T, HIGH SENSITIVITY - Normal    Troponin T, High Sensitivity 8        Emergency Department Course & Assessments:         Interventions:  Medications   lisinopril (ZESTRIL) tablet 10 mg (10 mg Oral $Given 3/15/23 0010)        Social Determinants of Health affecting care:   None    Disposition:  The patient was discharged to home.     Impression & Plan      Medical Decision Making:  Pt presents due to elevated blood pressure.  She reports her blood pressure has been bouncing up and down.  She believes it is elevated because of white coat syndrome.  She had been to her neurologist, who noted her BP was elevated.  They directed her to urgent care, who did not evaluate the pt, but sent her to the ER.  Pt has no complaints.  Her exam was reassuring with no signs of stroke.  Blood work was reassuring.  Pt was given an additional dose of lisinopril and recommended to increase her lisinopril from 20mg to 30mg per day and follow up closely with her PCP for further evaluation and management.     Diagnosis:    ICD-10-CM    1. Hypertension, unspecified type  I10            Discharge Medications:  Discharge  Medication List as of 3/14/2023 11:52 PM      START taking these medications    Details   !! lisinopril (ZESTRIL) 5 MG tablet Take 2 tablets (10 mg) by mouth daily, Disp-15 tablet, R-0, Local Print       !! - Potential duplicate medications found. Please discuss with provider.             Scribe Disclosure:  I, CORRINE SOTO, am serving as a scribe at 11:19 PM on 3/14/2023 to document services personally performed by Ede Tidwell MD based on my observations and the provider's statements to me.     3/14/2023   Ede Tidwell MD Bergenstal, John A, MD  03/17/23 0430

## 2023-03-15 NOTE — TELEPHONE ENCOUNTER
Patient called requesting to speak with a physician/leader about her visit yesterday.    Patient stated her BP was high and they sent her to Urgent Care.  At Urgent Care they sent her to the ED without seeing her, subsequently she was there for 8.5 hours.    She is not certain if she needs to come back and be seen again, has questions about medications, and there was reference about her kidneys but with everything going on- she didn't even grasp what was said.     She is hoping someone will connect with her today as she is very anxious.

## 2023-03-15 NOTE — DISCHARGE INSTRUCTIONS
Please try to monitor and record your blood pressure.  Please take a total of 30mg of lisinopril daily and follow up closely with your doctor.

## 2023-03-15 NOTE — TELEPHONE ENCOUNTER
Discussed patient's concerns over the phone.  She was wondering about if she needs to follow-up in neurology clinic after her blood pressure addressed, which I think is not necessary.  Follow-up should be on as-needed basis.  She was also wondering what she should tell her primary care provider, Dr. Zapata, about possibility of renal artery stenosis, and we discussed that.  All information is detailed in the neurology clinic note from yesterday.  Forest Mayes MD.

## 2023-06-29 ENCOUNTER — HOSPITAL ENCOUNTER (OUTPATIENT)
Dept: MAMMOGRAPHY | Facility: CLINIC | Age: 83
Discharge: HOME | End: 2023-06-29
Attending: INTERNAL MEDICINE | Admitting: INTERNAL MEDICINE
Payer: MEDICARE

## 2023-06-29 DIAGNOSIS — Z12.31 VISIT FOR SCREENING MAMMOGRAM: ICD-10-CM

## 2023-06-29 PROCEDURE — 77067 SCR MAMMO BI INCL CAD: CPT

## 2023-08-07 ENCOUNTER — APPOINTMENT (OUTPATIENT)
Dept: ULTRASOUND IMAGING | Facility: CLINIC | Age: 83
End: 2023-08-07
Attending: EMERGENCY MEDICINE
Payer: MEDICARE

## 2023-08-07 ENCOUNTER — HOSPITAL ENCOUNTER (EMERGENCY)
Facility: CLINIC | Age: 83
Discharge: HOME | End: 2023-08-07
Attending: EMERGENCY MEDICINE | Admitting: EMERGENCY MEDICINE
Payer: MEDICARE

## 2023-08-07 VITALS
OXYGEN SATURATION: 95 % | SYSTOLIC BLOOD PRESSURE: 173 MMHG | TEMPERATURE: 98 F | RESPIRATION RATE: 18 BRPM | HEART RATE: 67 BPM | DIASTOLIC BLOOD PRESSURE: 79 MMHG

## 2023-08-07 DIAGNOSIS — R60.0 PERIPHERAL EDEMA: ICD-10-CM

## 2023-08-07 LAB
ALBUMIN SERPL BCG-MCNC: 4.3 G/DL (ref 3.5–5.2)
ALP SERPL-CCNC: 96 U/L (ref 35–104)
ALT SERPL W P-5'-P-CCNC: 26 U/L (ref 0–50)
ANION GAP SERPL CALCULATED.3IONS-SCNC: 9 MMOL/L (ref 7–15)
AST SERPL W P-5'-P-CCNC: 37 U/L (ref 0–45)
BASOPHILS # BLD AUTO: 0.1 10E3/UL (ref 0–0.2)
BASOPHILS NFR BLD AUTO: 1 %
BILIRUB DIRECT SERPL-MCNC: <0.2 MG/DL (ref 0–0.3)
BILIRUB SERPL-MCNC: 0.5 MG/DL
BUN SERPL-MCNC: 17.6 MG/DL (ref 8–23)
CALCIUM SERPL-MCNC: 9.3 MG/DL (ref 8.8–10.2)
CHLORIDE SERPL-SCNC: 106 MMOL/L (ref 98–107)
CREAT SERPL-MCNC: 0.96 MG/DL (ref 0.51–0.95)
DEPRECATED HCO3 PLAS-SCNC: 27 MMOL/L (ref 22–29)
EOSINOPHIL # BLD AUTO: 0.4 10E3/UL (ref 0–0.7)
EOSINOPHIL NFR BLD AUTO: 4 %
ERYTHROCYTE [DISTWIDTH] IN BLOOD BY AUTOMATED COUNT: 13.8 % (ref 10–15)
GFR SERPL CREATININE-BSD FRML MDRD: 58 ML/MIN/1.73M2
GLUCOSE SERPL-MCNC: 153 MG/DL (ref 70–99)
HCT VFR BLD AUTO: 34.5 % (ref 35–47)
HGB BLD-MCNC: 11.5 G/DL (ref 11.7–15.7)
HOLD SPECIMEN: NORMAL
HOLD SPECIMEN: NORMAL
IMM GRANULOCYTES # BLD: 0 10E3/UL
IMM GRANULOCYTES NFR BLD: 0 %
LYMPHOCYTES # BLD AUTO: 2.6 10E3/UL (ref 0.8–5.3)
LYMPHOCYTES NFR BLD AUTO: 26 %
MCH RBC QN AUTO: 31.9 PG (ref 26.5–33)
MCHC RBC AUTO-ENTMCNC: 33.3 G/DL (ref 31.5–36.5)
MCV RBC AUTO: 96 FL (ref 78–100)
MONOCYTES # BLD AUTO: 0.8 10E3/UL (ref 0–1.3)
MONOCYTES NFR BLD AUTO: 8 %
NEUTROPHILS # BLD AUTO: 5.9 10E3/UL (ref 1.6–8.3)
NEUTROPHILS NFR BLD AUTO: 61 %
NRBC # BLD AUTO: 0 10E3/UL
NRBC BLD AUTO-RTO: 0 /100
NT-PROBNP SERPL-MCNC: 234 PG/ML (ref 0–1800)
PLATELET # BLD AUTO: 313 10E3/UL (ref 150–450)
POTASSIUM SERPL-SCNC: 3.6 MMOL/L (ref 3.4–5.3)
PROT SERPL-MCNC: 7.3 G/DL (ref 6.4–8.3)
RBC # BLD AUTO: 3.61 10E6/UL (ref 3.8–5.2)
SODIUM SERPL-SCNC: 142 MMOL/L (ref 136–145)
WBC # BLD AUTO: 9.7 10E3/UL (ref 4–11)

## 2023-08-07 PROCEDURE — 83880 ASSAY OF NATRIURETIC PEPTIDE: CPT | Mod: GZ | Performed by: EMERGENCY MEDICINE

## 2023-08-07 PROCEDURE — 85025 COMPLETE CBC W/AUTO DIFF WBC: CPT | Performed by: EMERGENCY MEDICINE

## 2023-08-07 PROCEDURE — 99284 EMERGENCY DEPT VISIT MOD MDM: CPT | Mod: 25

## 2023-08-07 PROCEDURE — 82248 BILIRUBIN DIRECT: CPT | Performed by: EMERGENCY MEDICINE

## 2023-08-07 PROCEDURE — 36415 COLL VENOUS BLD VENIPUNCTURE: CPT | Performed by: EMERGENCY MEDICINE

## 2023-08-07 PROCEDURE — 93970 EXTREMITY STUDY: CPT

## 2023-08-07 PROCEDURE — 80053 COMPREHEN METABOLIC PANEL: CPT | Performed by: EMERGENCY MEDICINE

## 2023-08-07 ASSESSMENT — ACTIVITIES OF DAILY LIVING (ADL): ADLS_ACUITY_SCORE: 33

## 2023-08-07 NOTE — ED NOTES
PIT/Triage Evaluation    Patient presented with BLE edema. Sx first started a few months ago. Pt has been taking amlodipine for nearly one year and is worried that this could be contributing. Sx resolve overnight but progress during the day. No SOB. No orthopnea. No h/o CHF.     Exam is notable for:    Patient Vitals for the past 24 hrs:   BP Temp Temp src Pulse Resp SpO2   08/07/23 1437 (!) 168/75 98  F (36.7  C) Temporal 67 18 99 %     General:  Alert, interactive  Cardiovascular:  Well perfused  Lungs:  No respiratory distress, no accessory muscle use  Neuro:  Moving all 4 extremities  Skin:  Warm, dry  Psych:  Normal affect    Appropriate interventions for symptom management were initiated if applicable.  Appropriate diagnostic tests were initiated if indicated.    Important information for subsequent clinician:    I briefly evaluated the patient and developed an initial plan of care. I discussed this plan and explained that this brief interaction does not constitute a full evaluation. Patient/family understands that they should wait to be fully evaluated and discuss any test results with another clinician prior to leaving the hospital.       Roly Dooley MD  08/07/23 6500

## 2023-08-07 NOTE — ED PROVIDER NOTES
History     Chief Complaint:  Leg Swelling     The history is provided by the patient.      Candy Garsia is a 83 year old female on Aspirin who presented with BLE edema. Sx first started a few months ago. Pt has been taking amlodipine for nearly one year and is worried that this could be contributing. Sx resolve overnight but progress during the day. No SOB. No orthopnea. No h/o CHF.     Independent Historian:   None - Patient Only    Medications:    Aspirin   Atorvastatin   Clopidogrel    Cyanocobalamin   Estradiol   Lisinopril    Metoprolol tartrate   Rabeprazole   Amlodipine   Azelastine     Past Medical History:    Dyslipidemia   Acute recurrent maxillary sinusitis  Anemia of unknown etiology   Vertigo   Plantar fasciitis   Chest pain   Hypercholesterolemia  GERD  History of CVA  Hyperlipidemia       Physical Exam   Patient Vitals for the past 24 hrs:   BP Temp Temp src Pulse Resp SpO2   08/07/23 1437 (!) 168/75 98  F (36.7  C) Temporal 67 18 99 %        Physical Exam  VS: Reviewed per above  HENT: Mucous membranes moist  EYES: sclera anicteric  CV: Rate as noted,  regular rhythm.   RESP: Effort normal. Breath sounds are normal bilaterally.  GI: no tenderness/rebound/guarding, not distended.  NEURO: Alert, moving all extremities  MSK: No deformity of the extremities, mild symmetric lower extremity edema.  SKIN: Warm and dry    Emergency Department Course     Imaging:  US Lower Extremity Venous Duplex Bilateral   Final Result   IMPRESSION:   1.  No deep venous thrombosis in the bilateral lower extremities.         Report per radiology    Laboratory:  Labs Ordered and Resulted from Time of ED Arrival to Time of ED Departure   BASIC METABOLIC PANEL - Abnormal       Result Value    Sodium 142      Potassium 3.6      Chloride 106      Carbon Dioxide (CO2) 27      Anion Gap 9      Urea Nitrogen 17.6      Creatinine 0.96 (*)     Calcium 9.3      Glucose 153 (*)     GFR Estimate 58 (*)    CBC WITH PLATELETS AND  DIFFERENTIAL - Abnormal    WBC Count 9.7      RBC Count 3.61 (*)     Hemoglobin 11.5 (*)     Hematocrit 34.5 (*)     MCV 96      MCH 31.9      MCHC 33.3      RDW 13.8      Platelet Count 313      % Neutrophils 61      % Lymphocytes 26      % Monocytes 8      % Eosinophils 4      % Basophils 1      % Immature Granulocytes 0      NRBCs per 100 WBC 0      Absolute Neutrophils 5.9      Absolute Lymphocytes 2.6      Absolute Monocytes 0.8      Absolute Eosinophils 0.4      Absolute Basophils 0.1      Absolute Immature Granulocytes 0.0      Absolute NRBCs 0.0     NT PROBNP INPATIENT - Normal    N terminal Pro BNP Inpatient 234     HEPATIC FUNCTION PANEL - Normal    Protein Total 7.3      Albumin 4.3      Bilirubin Total 0.5      Alkaline Phosphatase 96      AST 37      ALT 26      Bilirubin Direct <0.20        Emergency Department Course & Assessments:    Interventions:  Medications - No data to display     Assessments:  1540 I obtained the patient's history and examined as noted above.    1839 I rechecked the patient and explained findings.     Independent Interpretation (X-rays, CTs, rhythm strip):  None    Consultations/Discussion of Management or Tests:  None       Disposition:  The patient was discharged to home.     Impression & Plan    CMS Diagnoses: None    Medical Decision Making:  Presents to the ER for evaluation of subacute intermittent lower extremity edema.  Vital signs normal for mild hypertension.  No clinical signs of CHF.  I also do not appreciate clinical signs of skin or soft tissue infection.  Labs without evidence of hypoalbuminemia or renal impairment or CHF either.  Ultrasound without signs of DVT.  Considered nonspecific lymphedema versus medication side effect.  Plan to stop taking amlodipine and monitor blood pressures over the next week to determine need to her blood pressure regimen further as outpatient.  Return precautions discussed prior to discharge.      Diagnosis:    ICD-10-CM    1.  Peripheral edema  R60.9          Scribe Disclosure:  I, Alee Schmidt, am serving as a scribe at 5:04 PM on 8/7/2023 to document services personally performed by Roly Dooley MD based on my observations and the provider's statements to me.     8/7/2023   Roly Dooley MD Lindenbaum, Elan, MD  08/07/23 9583

## 2023-08-07 NOTE — DISCHARGE INSTRUCTIONS
You came to the ER for leg swelling.  We did not see signs of heart failure or kidney problems or liver problems.  There is concerned that the amlodipine you are taking is causing this.  Please stop the amlodipine and measure your blood pressure twice a day in a seated composition with her arm at her side.  Please show this blood pressure journal to her primary care doctor after 1 week's time to assess the need for blood pressure regimen changes.  Return for new concerns or symptoms.

## 2023-08-07 NOTE — ED TRIAGE NOTES
A&O x4, ABCs intact. Pt presents with leg swelling. Pt states that her right leg is more swollen than left. Pt states she was sent to r/o blood clot. Pt denies leg pain. No redness note don calf. .        Triage Assessment       Row Name 08/07/23 0623       Triage Assessment (Adult)    Airway WDL WDL       Respiratory WDL    Respiratory WDL WDL       Cardiac WDL    Cardiac WDL WDL

## 2024-07-02 ENCOUNTER — HOSPITAL ENCOUNTER (OUTPATIENT)
Dept: MAMMOGRAPHY | Facility: CLINIC | Age: 84
Discharge: HOME | End: 2024-07-02
Attending: INTERNAL MEDICINE | Admitting: INTERNAL MEDICINE
Payer: MEDICARE

## 2024-07-02 DIAGNOSIS — Z12.31 VISIT FOR SCREENING MAMMOGRAM: ICD-10-CM

## 2024-07-02 PROCEDURE — 77063 BREAST TOMOSYNTHESIS BI: CPT

## 2025-07-22 ENCOUNTER — HOSPITAL ENCOUNTER (OUTPATIENT)
Dept: MAMMOGRAPHY | Facility: CLINIC | Age: 85
Discharge: HOME | End: 2025-07-22
Attending: INTERNAL MEDICINE
Payer: MEDICARE

## 2025-07-22 DIAGNOSIS — Z12.31 VISIT FOR SCREENING MAMMOGRAM: ICD-10-CM

## 2025-07-22 PROCEDURE — 77063 BREAST TOMOSYNTHESIS BI: CPT
